# Patient Record
Sex: MALE | Race: WHITE | NOT HISPANIC OR LATINO | URBAN - METROPOLITAN AREA
[De-identification: names, ages, dates, MRNs, and addresses within clinical notes are randomized per-mention and may not be internally consistent; named-entity substitution may affect disease eponyms.]

---

## 2018-02-02 PROBLEM — Z00.00 ENCOUNTER FOR PREVENTIVE HEALTH EXAMINATION: Status: ACTIVE | Noted: 2018-02-02

## 2018-02-08 VITALS
HEART RATE: 60 BPM | TEMPERATURE: 97 F | RESPIRATION RATE: 16 BRPM | HEIGHT: 71 IN | OXYGEN SATURATION: 97 % | WEIGHT: 220.02 LBS | SYSTOLIC BLOOD PRESSURE: 122 MMHG | DIASTOLIC BLOOD PRESSURE: 55 MMHG

## 2018-02-08 NOTE — H&P ADULT - HISTORY OF PRESENT ILLNESS
***SKELETON:      67 y.o Male former smoker, with PMHx of HTN, Hyperlipidemia, GERD, NIDDM, KESHIA on CPAP @ night,     who presented to his cardiologist c/o CHUN     Denies CP,   palpitations, dizziness, syncope, recent PND/orthopnea, or LE edema.    In light of pt's risk factors, above CCS Anginal Class _____ symptoms, and an abnormal Stress test, pt is now referred to [facility] for recommended Cardiac Cath with possible intervention if clinically indicated to  r/o suspected underlying CAD. ***PT TO BRING IN ALL MEDS     67 y.o Male former smoker, with PMHx of HTN, Hyperlipidemia, GERD, NIDDM, KESHIA (non-compliant with CPAP >6months), Ventricular Ectopy s/p unsuccessful ablation @ [facility] ~ 2yrs ago (like secondary to low EF), Chronic Systolic HF (EF   ) on Entresto, who presented to his cardiologist Dr. Mathew for routine follow-up. Pt states he has been feeling much better since his HF meds were adjusted.  He has experienced dyspnea on exertion  and  "fleeting" CP while at rest in the past.   Presently he denies experiencing any symptoms.  He states he plays raVitryn without any limitations or any anginal symptoms.  Denies CP, SOB, palpitations, dizziness, syncope, recent PND/orthopnea, or LE edema.  Echo performed 10/31/17 revealed moderate diffuse hypokinesis, moderate left atrial dilatation, mild MR/TR,  LVEF of 34%.  No recent  diagnostic cardiac studies performed (Confirmed with Dr. Mathew's office).      In light of pt's risk factors, above CCS Anginal Class 4 symptoms in  and a depressed EF, pt is now referred to [facility] for recommended Right and Left Heart to r/o ischemic etiology for worsening HF. ***PT TO BRING IN ALL MEDS     67 y.o Male former smoker, with PMHx of HTN, Hyperlipidemia, GERD, NIDDM, KESHIA (non-compliant with CPAP >6months), Ventricular Ectopy s/p unsuccessful ablation @ [facility] ~ 2yrs ago (like secondary to low EF), Chronic Systolic HF (EF  34%) on Entresto, who presented to his cardiologist Dr. Mathew for routine follow-up. Pt states he has been feeling much better since his HF meds were adjusted.  He has experienced dyspnea on exertion  and  "fleeting" CP while at rest in the past.   Presently he denies experiencing any symptoms.  He states he plays raVastech without any limitations or any anginal symptoms.  Denies CP, SOB, palpitations, dizziness, syncope, recent PND/orthopnea, or LE edema.  Echo performed 10/31/17 revealed moderate diffuse hypokinesis, moderate left atrial dilatation, mild MR/TR,  LVEF of 34%.  No recent  diagnostic cardiac studies performed (Confirmed with Dr. Mathew's office).      In light of pt's risk factors, above CCS Anginal Class 4 symptoms in  and a depressed EF, pt is now referred to [facility] for recommended Right and Left Heart to r/o ischemic etiology for worsening HF. 67 y.o Male former smoker, with PMHx of HTN, Hyperlipidemia, GERD, NIDDM, KESHIA (non-compliant with CPAP >6months), Ventricular Ectopy s/p unsuccessful ablation @ [facility] ~ 2yrs ago (like secondary to low EF), Chronic Systolic HF (EF  34%) on Entresto, who presented to his cardiologist Dr. Mathew for routine follow-up. Pt states he has been feeling much better since his HF meds were adjusted.  He has experienced dyspnea on exertion  and  "fleeting" CP while at rest in the past.   Presently he denies experiencing any symptoms.  He states he plays raLet it Wave without any limitations or any anginal symptoms.  Denies CP, SOB, palpitations, dizziness, syncope, recent PND/orthopnea, or LE edema.  Echo performed 10/31/17 revealed moderate diffuse hypokinesis, moderate left atrial dilatation, mild MR/TR,  LVEF of 34%.  No recent  diagnostic cardiac studies performed (Confirmed with Dr. Mathew's office).      In light of pt's risk factors, above CCS Anginal Class 4 symptoms in  and a depressed EF, pt is now referred to [facility] for recommended Right and Left Heart to r/o ischemic etiology for worsening HF.

## 2018-02-08 NOTE — H&P ADULT - NSHPLABSRESULTS_GEN_ALL_CORE
EKG SR @ 60 BPM with Q waves in v1, v2, I EKG SR @ 60 BPM with Q waves in v1, v2, I    iStat labs 2/12/18 Na 139, K 4.7, Cl 102, Glu 142, BUN/Cr 23/0.7, Hgb 14.3, PT 12.2, INR 1.0

## 2018-02-08 NOTE — H&P ADULT - ASSESSMENT
67 y.o Male former smoker, with PMHx of HTN, Hyperlipidemia, GERD, NIDDM, KESHIA (non-compliant with CPAP >6months), Ventricular Ectopy s/p unsuccessful ablation @ [facility] ~ 2yrs ago (like secondary to low EF), Chronic Systolic HF (EF  34%) on Entresto, who presentes with CCS Anginal Class 4 symptoms and a depressed EF, who is now referred to [facility] for recommended Right and Left Heart to r/o ischemic etiology for worsening HF.        Pt takes ASA 81mg PO daily at home, last dose was today 2/12.    Risks & benefits of procedure and alternative therapy have been explained to the patient including but not limited to: allergic reaction, bleeding w/possible need for blood transfusion, infection, renal and vascular compromise, limb damage, arrhythmia, stroke, vessel dissection/perforation, Myocardial infarction, emergent CABG. Informed consent obtained and in chart. 67 y.o Male former smoker, with PMHx of HTN, Hyperlipidemia, GERD, NIDDM, EKSHIA (non-compliant with CPAP >6months), Ventricular Ectopy s/p unsuccessful ablation @ [facility] ~ 2yrs ago (like secondary to low EF), Chronic Systolic HF (EF  34%) on Entresto, who presentes with CCS Anginal Class 4 symptoms and a depressed EF, who is now referred to [facility] for recommended Right and Left Heart to r/o ischemic etiology for worsening HF.        Pt takes ASA 81mg PO daily at home, last dose was today 2/12. Pt was given ASA 243mg PO x 1 and Plavix 600mg PO x 1 prior to procedure.     iStat labs accepted per policy that iStat labs are accepted for first cath lab patients.    Risks & benefits of procedure and alternative therapy have been explained to the patient including but not limited to: allergic reaction, bleeding w/possible need for blood transfusion, infection, renal and vascular compromise, limb damage, arrhythmia, stroke, vessel dissection/perforation, Myocardial infarction, emergent CABG. Informed consent obtained and in chart.

## 2018-02-08 NOTE — H&P ADULT - PMH
Cardiomyopathy    Erectile dysfunction    Hyperlipidemia    Hypertension    KESHIA (obstructive sleep apnea) Cardiomyopathy    Erectile dysfunction    Hyperlipidemia    Hypertension    KESHIA (obstructive sleep apnea)    Ventricular ectopy

## 2018-02-12 ENCOUNTER — INPATIENT (INPATIENT)
Facility: HOSPITAL | Age: 68
LOS: 0 days | Discharge: HOME | DRG: 287 | End: 2018-02-12
Attending: INTERNAL MEDICINE | Admitting: INTERNAL MEDICINE
Payer: COMMERCIAL

## 2018-02-12 DIAGNOSIS — Z98.890 OTHER SPECIFIED POSTPROCEDURAL STATES: Chronic | ICD-10-CM

## 2018-02-12 LAB
ALBUMIN SERPL ELPH-MCNC: 4.4 G/DL — SIGNIFICANT CHANGE UP (ref 3.3–5)
ALP SERPL-CCNC: 69 U/L — SIGNIFICANT CHANGE UP (ref 40–120)
ALT FLD-CCNC: 25 U/L — SIGNIFICANT CHANGE UP (ref 10–45)
ANION GAP SERPL CALC-SCNC: 14 MMOL/L — SIGNIFICANT CHANGE UP (ref 5–17)
APTT BLD: 27.4 SEC — LOW (ref 27.5–37.4)
AST SERPL-CCNC: 23 U/L — SIGNIFICANT CHANGE UP (ref 10–40)
BASOPHILS NFR BLD AUTO: 0.5 % — SIGNIFICANT CHANGE UP (ref 0–2)
BILIRUB SERPL-MCNC: 0.4 MG/DL — SIGNIFICANT CHANGE UP (ref 0.2–1.2)
BUN SERPL-MCNC: 17 MG/DL — SIGNIFICANT CHANGE UP (ref 7–23)
CALCIUM SERPL-MCNC: 8.7 MG/DL — SIGNIFICANT CHANGE UP (ref 8.4–10.5)
CHLORIDE SERPL-SCNC: 100 MMOL/L — SIGNIFICANT CHANGE UP (ref 96–108)
CHOLEST SERPL-MCNC: 204 MG/DL — HIGH (ref 10–199)
CK MB CFR SERPL CALC: 2.8 NG/ML — SIGNIFICANT CHANGE UP (ref 0–6.7)
CO2 SERPL-SCNC: 22 MMOL/L — SIGNIFICANT CHANGE UP (ref 22–31)
CREAT SERPL-MCNC: 0.73 MG/DL — SIGNIFICANT CHANGE UP (ref 0.5–1.3)
CRP SERPL-MCNC: 0.21 MG/DL — SIGNIFICANT CHANGE UP (ref 0–0.4)
EOSINOPHIL NFR BLD AUTO: 5.3 % — SIGNIFICANT CHANGE UP (ref 0–6)
GLUCOSE SERPL-MCNC: 147 MG/DL — HIGH (ref 70–99)
HBA1C BLD-MCNC: 6.7 % — HIGH (ref 4–5.6)
HCT VFR BLD CALC: 41.2 % — SIGNIFICANT CHANGE UP (ref 39–50)
HDLC SERPL-MCNC: 43 MG/DL — SIGNIFICANT CHANGE UP (ref 40–125)
HGB BLD-MCNC: 14 G/DL — SIGNIFICANT CHANGE UP (ref 13–17)
INR BLD: 1.02 — SIGNIFICANT CHANGE UP (ref 0.88–1.16)
LIPID PNL WITH DIRECT LDL SERPL: 128 MG/DL — SIGNIFICANT CHANGE UP
LYMPHOCYTES # BLD AUTO: 31.8 % — SIGNIFICANT CHANGE UP (ref 13–44)
MCHC RBC-ENTMCNC: 30.2 PG — SIGNIFICANT CHANGE UP (ref 27–34)
MCHC RBC-ENTMCNC: 34 G/DL — SIGNIFICANT CHANGE UP (ref 32–36)
MCV RBC AUTO: 89 FL — SIGNIFICANT CHANGE UP (ref 80–100)
MONOCYTES NFR BLD AUTO: 10.1 % — SIGNIFICANT CHANGE UP (ref 2–14)
NEUTROPHILS NFR BLD AUTO: 52.3 % — SIGNIFICANT CHANGE UP (ref 43–77)
PLATELET # BLD AUTO: 135 K/UL — LOW (ref 150–400)
POTASSIUM SERPL-MCNC: 4.4 MMOL/L — SIGNIFICANT CHANGE UP (ref 3.5–5.3)
POTASSIUM SERPL-SCNC: 4.4 MMOL/L — SIGNIFICANT CHANGE UP (ref 3.5–5.3)
PROT SERPL-MCNC: 7.1 G/DL — SIGNIFICANT CHANGE UP (ref 6–8.3)
PROTHROM AB SERPL-ACNC: 11.3 SEC — SIGNIFICANT CHANGE UP (ref 9.8–12.7)
RBC # BLD: 4.63 M/UL — SIGNIFICANT CHANGE UP (ref 4.2–5.8)
RBC # FLD: 13.2 % — SIGNIFICANT CHANGE UP (ref 10.3–16.9)
SODIUM SERPL-SCNC: 136 MMOL/L — SIGNIFICANT CHANGE UP (ref 135–145)
TOTAL CHOLESTEROL/HDL RATIO MEASUREMENT: 4.7 RATIO — SIGNIFICANT CHANGE UP (ref 3.4–9.6)
TRIGL SERPL-MCNC: 165 MG/DL — HIGH (ref 10–149)
WBC # BLD: 6.2 K/UL — SIGNIFICANT CHANGE UP (ref 3.8–10.5)
WBC # FLD AUTO: 6.2 K/UL — SIGNIFICANT CHANGE UP (ref 3.8–10.5)

## 2018-02-12 PROCEDURE — 93460 R&L HRT ART/VENTRICLE ANGIO: CPT | Mod: 26

## 2018-02-12 RX ORDER — CHLORHEXIDINE GLUCONATE 213 G/1000ML
1 SOLUTION TOPICAL ONCE
Qty: 0 | Refills: 0 | Status: DISCONTINUED | OUTPATIENT
Start: 2018-02-12 | End: 2018-02-12

## 2018-02-12 RX ORDER — CLOPIDOGREL BISULFATE 75 MG/1
600 TABLET, FILM COATED ORAL ONCE
Qty: 0 | Refills: 0 | Status: COMPLETED | OUTPATIENT
Start: 2018-02-12 | End: 2018-02-12

## 2018-02-12 RX ORDER — SODIUM CHLORIDE 9 MG/ML
500 INJECTION, SOLUTION INTRAVENOUS
Qty: 0 | Refills: 0 | Status: DISCONTINUED | OUTPATIENT
Start: 2018-02-12 | End: 2018-02-12

## 2018-02-12 RX ORDER — SODIUM CHLORIDE 9 MG/ML
1000 INJECTION, SOLUTION INTRAVENOUS
Qty: 0 | Refills: 0 | Status: DISCONTINUED | OUTPATIENT
Start: 2018-02-12 | End: 2018-02-12

## 2018-02-12 RX ORDER — ASPIRIN/CALCIUM CARB/MAGNESIUM 324 MG
243 TABLET ORAL ONCE
Qty: 0 | Refills: 0 | Status: COMPLETED | OUTPATIENT
Start: 2018-02-12 | End: 2018-02-12

## 2018-02-12 RX ADMIN — CLOPIDOGREL BISULFATE 600 MILLIGRAM(S): 75 TABLET, FILM COATED ORAL at 08:51

## 2018-02-12 RX ADMIN — Medication 243 MILLIGRAM(S): at 08:51

## 2018-02-12 RX ADMIN — SODIUM CHLORIDE 30 MILLILITER(S): 9 INJECTION, SOLUTION INTRAVENOUS at 08:53

## 2018-02-12 NOTE — PROGRESS NOTE ADULT - SUBJECTIVE AND OBJECTIVE BOX
Interventional Cardiology PA SDA Discharge Note    Patient without complaints. Ambulated and voided without difficulties    Afebrile, VSS    Ext:    		Right radial: no hematoma, no bleed, 2+ radial pulse. Brachial: no hematoma, no bleed, distal pulse intact    A/P:  67 y.o Male former smoker, with PMHx of HTN, Hyperlipidemia, GERD, NIDDM, KESHIA (non-compliant with CPAP >6months), Ventricular Ectopy s/p unsuccessful ablation @ [facility] ~ 2yrs ago (like secondary to low EF), Chronic Systolic HF (EF  34%) on Entresto, who presented to his cardiologist Dr. Mathew for routine follow-up. Pt states he has been feeling much better since his HF meds were adjusted.  He has experienced dyspnea on exertion  and  "fleeting" CP while at rest in the past.   Presently he denies experiencing any symptoms.  He states he plays raque[employer]os Riskall without any limitations or any anginal symptoms.    Echo performed 10/31/17 revealed moderate diffuse hypokinesis, moderate left atrial dilatation, mild MR/TR,  LVEF of 34%.  No recent  diagnostic cardiac studies performed (Confirmed with Dr. Mathew's office).  Pt was referred for cardiac catheterization today revealing non ischemic cardiomyopathy: 30% mLAD stenosis, pOM1 50% stenosis s/p FFR 0.98, RCA 30% stenosis, RHC:  RA=5, RV=34/0, PAP=30/10, PCWP=9 mm Hg. LV Gram:  30% EF, global hypokinesis, no AS, EDP=8 mm Hg. Patient recommended for EP consultation for possible ablation prior to discharge.   Patient will follow up with Dr. Mathew upon discharge.                     1.	Stable for discharge as per attending  ___[place]______ after bed rest, pt voids, groin/wrist stable and 30 minutes of ambulation.  2.	Follow-up with PMD/Cardiologist ___Dr. Smith________ in 1-2 weeks  3.	Discharged forms signed and copies in chart Interventional Cardiology PA SDA Discharge Note    Patient without complaints. Ambulated and voided without difficulties    Afebrile, VSS    Ext:    		Right radial: no hematoma, no bleed, 2+ radial pulse. Brachial: no hematoma, no bleed, distal pulse intact    A/P:  67 y.o Male former smoker, with PMHx of HTN, Hyperlipidemia, GERD, NIDDM, KESHIA (non-compliant with CPAP >6months), Ventricular Ectopy s/p unsuccessful ablation @ [facility] ~ 2yrs ago (like secondary to low EF), Chronic Systolic HF (EF  34%) on Entresto, who presented to his cardiologist Dr. Mathew for routine follow-up. Pt states he has been feeling much better since his HF meds were adjusted.  He has experienced dyspnea on exertion  and  "fleeting" CP while at rest in the past.   Presently he denies experiencing any symptoms.  He states he plays raqueDivideall without any limitations or any anginal symptoms.    Echo performed 10/31/17 revealed moderate diffuse hypokinesis, moderate left atrial dilatation, mild MR/TR,  LVEF of 34%.  No recent  diagnostic cardiac studies performed (Confirmed with Dr. Mathew's office).  Pt was referred for cardiac catheterization today revealing non ischemic cardiomyopathy: 30% mLAD stenosis, pOM1 50% stenosis s/p FFR 0.98, RCA 30% stenosis, RHC:  RA=5, RV=34/0, PAP=30/10, PCWP=9 mm Hg. LV Gram:  30% EF, global hypokinesis, no AS, EDP=8 mm Hg. Patient recommended for EP consultation to discuss possible ablation prior to discharge. Patient will follow up with Dr. Mathew upon discharge.                     1.	Stable for discharge as per attending  ___[place]______ after bed rest, pt voids, groin/wrist stable and 30 minutes of ambulation.  2.	Follow-up with PMD/Cardiologist ___Dr. Smith________ in 1-2 weeks  3.	Discharged forms signed and copies in chart

## 2018-02-13 ENCOUNTER — OTHER (OUTPATIENT)
Age: 68
End: 2018-02-13

## 2018-02-13 ENCOUNTER — INPATIENT (INPATIENT)
Facility: HOSPITAL | Age: 68
LOS: 0 days | Discharge: HOME | DRG: 274 | End: 2018-02-14
Attending: INTERNAL MEDICINE | Admitting: INTERNAL MEDICINE
Payer: COMMERCIAL

## 2018-02-13 VITALS
SYSTOLIC BLOOD PRESSURE: 113 MMHG | DIASTOLIC BLOOD PRESSURE: 66 MMHG | WEIGHT: 200.4 LBS | OXYGEN SATURATION: 97 % | RESPIRATION RATE: 18 BRPM | HEART RATE: 60 BPM | HEIGHT: 71 IN

## 2018-02-13 DIAGNOSIS — I10 ESSENTIAL (PRIMARY) HYPERTENSION: ICD-10-CM

## 2018-02-13 DIAGNOSIS — I49.3 VENTRICULAR PREMATURE DEPOLARIZATION: ICD-10-CM

## 2018-02-13 DIAGNOSIS — Z98.890 OTHER SPECIFIED POSTPROCEDURAL STATES: Chronic | ICD-10-CM

## 2018-02-13 DIAGNOSIS — I42.9 CARDIOMYOPATHY, UNSPECIFIED: ICD-10-CM

## 2018-02-13 DIAGNOSIS — E78.00 PURE HYPERCHOLESTEROLEMIA, UNSPECIFIED: ICD-10-CM

## 2018-02-13 PROBLEM — G47.33 OBSTRUCTIVE SLEEP APNEA (ADULT) (PEDIATRIC): Chronic | Status: ACTIVE | Noted: 2018-02-08

## 2018-02-13 PROBLEM — N52.9 MALE ERECTILE DYSFUNCTION, UNSPECIFIED: Chronic | Status: ACTIVE | Noted: 2018-02-08

## 2018-02-13 PROBLEM — E78.5 HYPERLIPIDEMIA, UNSPECIFIED: Chronic | Status: ACTIVE | Noted: 2018-02-08

## 2018-02-13 LAB — GLUCOSE BLDC GLUCOMTR-MCNC: 136 MG/DL — HIGH (ref 70–99)

## 2018-02-13 PROCEDURE — 93654 COMPRE EP EVAL TX VT: CPT

## 2018-02-13 PROCEDURE — 93623 PRGRMD STIMJ&PACG IV RX NFS: CPT | Mod: 26

## 2018-02-13 PROCEDURE — 99223 1ST HOSP IP/OBS HIGH 75: CPT | Mod: 25

## 2018-02-13 PROCEDURE — 74150 CT ABDOMEN W/O CONTRAST: CPT | Mod: 26

## 2018-02-13 PROCEDURE — 71250 CT THORAX DX C-: CPT | Mod: 26

## 2018-02-13 PROCEDURE — 93621 COMP EP EVL L PAC&REC C SINS: CPT | Mod: 26

## 2018-02-13 RX ORDER — AMIODARONE HYDROCHLORIDE 400 MG/1
400 TABLET ORAL
Qty: 0 | Refills: 0 | Status: DISCONTINUED | OUTPATIENT
Start: 2018-02-13 | End: 2018-02-13

## 2018-02-13 RX ORDER — SACUBITRIL AND VALSARTAN 24; 26 MG/1; MG/1
1 TABLET, FILM COATED ORAL
Qty: 0 | Refills: 0 | COMMUNITY

## 2018-02-13 RX ORDER — ASPIRIN/CALCIUM CARB/MAGNESIUM 324 MG
1 TABLET ORAL
Qty: 0 | Refills: 0 | COMMUNITY

## 2018-02-13 RX ORDER — ATORVASTATIN CALCIUM 80 MG/1
10 TABLET, FILM COATED ORAL DAILY
Qty: 0 | Refills: 0 | Status: DISCONTINUED | OUTPATIENT
Start: 2018-02-13 | End: 2018-02-14

## 2018-02-13 RX ORDER — ACETAMINOPHEN 500 MG
650 TABLET ORAL EVERY 6 HOURS
Qty: 0 | Refills: 0 | Status: DISCONTINUED | OUTPATIENT
Start: 2018-02-13 | End: 2018-02-14

## 2018-02-13 RX ORDER — ASPIRIN/CALCIUM CARB/MAGNESIUM 324 MG
81 TABLET ORAL DAILY
Qty: 0 | Refills: 0 | Status: DISCONTINUED | OUTPATIENT
Start: 2018-02-13 | End: 2018-02-14

## 2018-02-13 RX ORDER — CARVEDILOL PHOSPHATE 80 MG/1
1 CAPSULE, EXTENDED RELEASE ORAL
Qty: 0 | Refills: 0 | COMMUNITY

## 2018-02-13 RX ORDER — ATORVASTATIN CALCIUM 80 MG/1
1 TABLET, FILM COATED ORAL
Qty: 0 | Refills: 0 | COMMUNITY

## 2018-02-13 RX ORDER — FUROSEMIDE 40 MG
1 TABLET ORAL
Qty: 0 | Refills: 0 | COMMUNITY

## 2018-02-13 RX ORDER — FUROSEMIDE 40 MG
20 TABLET ORAL DAILY
Qty: 0 | Refills: 0 | Status: DISCONTINUED | OUTPATIENT
Start: 2018-02-13 | End: 2018-02-14

## 2018-02-13 RX ORDER — CARVEDILOL PHOSPHATE 80 MG/1
12.5 CAPSULE, EXTENDED RELEASE ORAL
Qty: 0 | Refills: 0 | Status: DISCONTINUED | OUTPATIENT
Start: 2018-02-13 | End: 2018-02-14

## 2018-02-13 RX ORDER — OXYCODONE AND ACETAMINOPHEN 5; 325 MG/1; MG/1
1 TABLET ORAL ONCE
Qty: 0 | Refills: 0 | Status: DISCONTINUED | OUTPATIENT
Start: 2018-02-13 | End: 2018-02-14

## 2018-02-13 RX ORDER — AMIODARONE HYDROCHLORIDE 400 MG/1
400 TABLET ORAL THREE TIMES A DAY
Qty: 0 | Refills: 0 | Status: DISCONTINUED | OUTPATIENT
Start: 2018-02-13 | End: 2018-02-14

## 2018-02-13 RX ORDER — SACUBITRIL AND VALSARTAN 24; 26 MG/1; MG/1
1 TABLET, FILM COATED ORAL
Qty: 0 | Refills: 0 | Status: DISCONTINUED | OUTPATIENT
Start: 2018-02-13 | End: 2018-02-14

## 2018-02-13 RX ADMIN — Medication 650 MILLIGRAM(S): at 19:20

## 2018-02-13 RX ADMIN — Medication 650 MILLIGRAM(S): at 17:56

## 2018-02-13 RX ADMIN — CARVEDILOL PHOSPHATE 12.5 MILLIGRAM(S): 80 CAPSULE, EXTENDED RELEASE ORAL at 17:56

## 2018-02-13 RX ADMIN — AMIODARONE HYDROCHLORIDE 400 MILLIGRAM(S): 400 TABLET ORAL at 21:55

## 2018-02-13 RX ADMIN — SACUBITRIL AND VALSARTAN 1 TABLET(S): 24; 26 TABLET, FILM COATED ORAL at 19:55

## 2018-02-13 NOTE — H&P ADULT - PMH
Cardiomyopathy    Erectile dysfunction    Hyperlipidemia    Hypertension    KESHIA (obstructive sleep apnea)    Ventricular ectopy

## 2018-02-13 NOTE — H&P ADULT - PROBLEM SELECTOR PLAN 1
The patient presents today for a PVC ablation. The procedure, and its associated risks, including but not limited to infection, vascular injury, cardiac perforation, phrenic nerve injury, and thromboembolism were discussed in detail. The patient has agreed to the procedure. Consent was signed. The patient will first proceed to CT scan wearing a cardio insight vest and then for ablation.    We will reevaluate need for medical therapies after the ablation.    Bedrest for 6 hours following sheath pull.

## 2018-02-13 NOTE — H&P ADULT - HISTORY OF PRESENT ILLNESS
67 y.o Male former smoker, with PMHx of HTN, Hyperlipidemia, GERD, NIDDM, KESHIA (non-compliant with CPAP >6months), Ventricular Ectopy s/p unsuccessful ablation @ [facility] ~ 2yrs ago (like secondary to low EF), Chronic Systolic HF (EF  34%) on Entresto, who presented to his cardiologist Dr. Mathew for routine follow-up. Pt states he has been feeling much better since his HF meds were adjusted.  He has experienced dyspnea on exertion  and  "fleeting" CP while at rest in the past.   Presently he denies experiencing any symptoms.  He states he plays ra"[employer] without any limitations or any anginal symptoms.  Denies CP, SOB, palpitations, dizziness, syncope, recent PND/orthopnea, or LE edema.  Echo performed 10/31/17 revealed moderate diffuse hypokinesis, moderate left atrial dilatation, mild MR/TR,  LVEF of 34%.  No recent  diagnostic cardiac studies performed (Confirmed with Dr. Mathew's office).      In light of pt's risk factors, above CCS Anginal Class 4 symptoms in  and a depressed EF, pt is now referred to [facility] for recommended Right and Left Heart to r/o ischemic etiology for worsening HF. Mr. Brown is a 67 y.o M, former smoker, with PMHx of HTN, Hyperlipidemia, GERD, NIDDM, KESHIA (non-compliant with CPAP >6months), high PVC burden s/p aborted ablation at [facility] 2yrs ago, Chronic Systolic HF (EF  34%) on Entresto, who presented to [facility] yesterday for a right and left heart cath with Dr. Smith which was nonobstructive. While in   Pt states he has been feeling much better since his HF meds were adjusted.  He has experienced dyspnea on exertion  and  "fleeting" CP while at rest in the past.   Presently he denies experiencing any symptoms.  He states he plays raqueCozy Queen without any limitations or any anginal symptoms.  Denies CP, SOB, palpitations, dizziness, syncope, recent PND/orthopnea, or LE edema.  Echo performed 10/31/17 revealed moderate diffuse hypokinesis, moderate left atrial dilatation, mild MR/TR,  LVEF of 34%.  No recent  diagnostic cardiac studies performed (Confirmed with Dr. Mathew's office).      In light of pt's risk factors, above CCS Anginal Class 4 symptoms in  and a depressed EF, pt is now referred to [facility] for recommended Right and Left Heart to r/o ischemic etiology for worsening HF.      Mr. Brown is s/p right and left cardiac catheterization which resulted as non-ischemic. He has a history of frequent PVCs with a reported burden of 20% causing systolic HF, EF 30% in cath, decreased from 34% in 10/2017. He is on carvedilol with no improvement in burden. He is asymptomatic, denying palpitations, c/p and lightheadedness. He had one failed PVC ablation in [place], [place]. He was told that the isolated ectopy was too close to sensitive structures and couldn't be ablated. They decided to put him on medical therapy. Despite carvedilol, entresto, and lasix, the patient's EF continues to deteriorate. He will be admitted today under EP for an ablation tomorrow with Dr. Barger. Mr. Brown is a 67 y.o M, former smoker, with PMHx of HTN, Hyperlipidemia, GERD, NIDDM, KESHIA (non-compliant with CPAP >6months), high PVC burden s/p aborted ablation at [facility] 2yrs ago, Chronic Systolic HF (EF  34%) on Entresto, s/p non-obstructive right and left sided cath with Dr. Smith yesterday who presents for an elective ablation of his PVC substrate.     The patient has a history of PVCs with a burden of around 20%. He went for an ablation at a hospital in [place], [place] two years ago, but he was told that the isolated ectopy was in a sensitive area - likely near the AV node, and they decided not to ablate and instead manage medically. While in   Pt states he has been feeling much better since his HF meds were adjusted.  He has experienced dyspnea on exertion  and  "fleeting" CP while at rest in the past.   Presently he denies experiencing any symptoms.  He states he plays raqueCono-C without any limitations or any anginal symptoms.  Denies CP, SOB, palpitations, dizziness, syncope, recent PND/orthopnea, or LE edema.  Echo performed 10/31/17 revealed moderate diffuse hypokinesis, moderate left atrial dilatation, mild MR/TR,  LVEF of 34%.  No recent  diagnostic cardiac studies performed (Confirmed with Dr. Mathew's office).      In light of pt's risk factors, above CCS Anginal Class 4 symptoms in  and a depressed EF, pt is now referred to [facility] for recommended Right and Left Heart to r/o ischemic etiology for worsening HF.      Mr. Brown is s/p right and left cardiac catheterization which resulted as non-ischemic. He has a history of frequent PVCs with a reported burden of 20% causing systolic HF, EF 30% in cath, decreased from 34% in 10/2017. He is on carvedilol with no improvement in burden. He is asymptomatic, denying palpitations, c/p and lightheadedness. He had one failed PVC ablation in [place], [place]. He was told that the isolated ectopy was too close to sensitive structures and couldn't be ablated. They decided to put him on medical therapy. Despite carvedilol, entresto, and lasix, the patient's EF continues to deteriorate. He will be admitted today under EP for an ablation tomorrow with Dr. Barger. Mr. Brown is a 67 y.o M, former smoker, with PMHx of HTN, Hyperlipidemia, GERD, NIDDM, KESHIA (non-compliant with CPAP >6months), high PVC burden s/p aborted ablation at [facility] 2yrs ago, Chronic Systolic HF (EF  34%, 10/17) on Entresto, s/p non-obstructive right and left sided cath with Dr. Smith yesterday who presents for an elective ablation of his PVC substrate.     The patient has a history of PVCs with a burden of around 20%. He went for an ablation at a hospital in [place], [place] two years ago, but he was told that the isolated ectopy was in a sensitive area - likely near the AV node, and they decided not to ablate and instead manage medically. He has been maintained on optimal medical management, including carvedilol, lasix (two months ago his ACE/ARB was switched to Entresto), with no EF improvement. He states that his dyspnea on exertion has improved since starting Entresto, but he c/o occasionally experiencing sob and c/p while at rest. He was referred for right and left sided cath with Dr. Smith yesterday to determine if his anginal-like symptoms/worsening HF was caused by CAD, however, the cath was non-obstructive. His intracath EF was 30%. We spoke to the patient after his cath yesterday about how his low HF is likely caused by his high PVC burden. He decided that he wanted to proceed with an ablation. Given that the patient is already in the city for his cath procedure, we booked him in as an ambulatory procedure for today. EKG from yesterday show PVCs originating from the left posterior-superior process of the left ventricle. The ablation will be guided with cardio [employer] technology. At present, he denies sob, c/p, palpitations, and lightheadedness.

## 2018-02-13 NOTE — H&P ADULT - ASSESSMENT
Mr. Brown is a 67 y.o M, former smoker, with PMHx of HTN, Hyperlipidemia, GERD, NIDDM, KESHIA (non-compliant with CPAP >6months), high PVC burden s/p aborted ablation at [facility] 2yrs ago, Chronic Systolic HF (EF  34%, 10/17) on Entresto, s/p non-obstructive right and left sided cath with Dr. Smith yesterday who presents for an elective ablation of his PVC substrate.

## 2018-02-13 NOTE — H&P ADULT - NSHPPHYSICALEXAM_GEN_ALL_CORE
General: No acute Distress  Neck: Supple, NO JVD  Cardiac: S1 S2, No M/R/G  Pulmonary: CTAB, Breathing unlabored, No Rhonchi/Rales/Wheezing  Abdomen: Soft, Non -tender, +BS x 4 quads  Extremities: No Rashes, No edema  Neuro: A/o x 3, No focal deficits

## 2018-02-13 NOTE — H&P ADULT - NSHPLABSRESULTS_GEN_ALL_CORE
14.0   6.2   )-----------( 135      ( 12 Feb 2018 07:45 )             41.2     02-12    136  |  100  |  17  ----------------------------<  147<H>  4.4   |  22  |  0.73    Ca    8.7      12 Feb 2018 07:45    TPro  7.1  /  Alb  4.4  /  TBili  0.4  /  DBili  x   /  AST  23  /  ALT  25  /  AlkPhos  69  02-12      PT/INR - ( 12 Feb 2018 07:45 )   PT: 11.3 sec;   INR: 1.02          PTT - ( 12 Feb 2018 07:45 )  PTT:27.4 sec

## 2018-02-14 ENCOUNTER — TRANSCRIPTION ENCOUNTER (OUTPATIENT)
Age: 68
End: 2018-02-14

## 2018-02-14 VITALS — WEIGHT: 225.75 LBS

## 2018-02-14 LAB
ANION GAP SERPL CALC-SCNC: 12 MMOL/L — SIGNIFICANT CHANGE UP (ref 5–17)
APTT BLD: 26.6 SEC — LOW (ref 27.5–37.4)
BUN SERPL-MCNC: 17 MG/DL — SIGNIFICANT CHANGE UP (ref 7–23)
CALCIUM SERPL-MCNC: 8.7 MG/DL — SIGNIFICANT CHANGE UP (ref 8.4–10.5)
CHLORIDE SERPL-SCNC: 101 MMOL/L — SIGNIFICANT CHANGE UP (ref 96–108)
CO2 SERPL-SCNC: 23 MMOL/L — SIGNIFICANT CHANGE UP (ref 22–31)
CREAT SERPL-MCNC: 0.73 MG/DL — SIGNIFICANT CHANGE UP (ref 0.5–1.3)
GLUCOSE BLDC GLUCOMTR-MCNC: 121 MG/DL — HIGH (ref 70–99)
GLUCOSE SERPL-MCNC: 164 MG/DL — HIGH (ref 70–99)
INR BLD: 1.09 — SIGNIFICANT CHANGE UP (ref 0.88–1.16)
MAGNESIUM SERPL-MCNC: 2 MG/DL — SIGNIFICANT CHANGE UP (ref 1.6–2.6)
POTASSIUM SERPL-MCNC: 4.7 MMOL/L — SIGNIFICANT CHANGE UP (ref 3.5–5.3)
POTASSIUM SERPL-SCNC: 4.7 MMOL/L — SIGNIFICANT CHANGE UP (ref 3.5–5.3)
PROTHROM AB SERPL-ACNC: 12.1 SEC — SIGNIFICANT CHANGE UP (ref 9.8–12.7)
SODIUM SERPL-SCNC: 136 MMOL/L — SIGNIFICANT CHANGE UP (ref 135–145)

## 2018-02-14 PROCEDURE — 82550 ASSAY OF CK (CPK): CPT

## 2018-02-14 PROCEDURE — C1894: CPT

## 2018-02-14 PROCEDURE — 80061 LIPID PANEL: CPT

## 2018-02-14 PROCEDURE — 86900 BLOOD TYPING SEROLOGIC ABO: CPT

## 2018-02-14 PROCEDURE — 82553 CREATINE MB FRACTION: CPT

## 2018-02-14 PROCEDURE — C1769: CPT

## 2018-02-14 PROCEDURE — 86901 BLOOD TYPING SEROLOGIC RH(D): CPT

## 2018-02-14 PROCEDURE — 83036 HEMOGLOBIN GLYCOSYLATED A1C: CPT

## 2018-02-14 PROCEDURE — 85730 THROMBOPLASTIN TIME PARTIAL: CPT

## 2018-02-14 PROCEDURE — 85025 COMPLETE CBC W/AUTO DIFF WBC: CPT

## 2018-02-14 PROCEDURE — 36415 COLL VENOUS BLD VENIPUNCTURE: CPT

## 2018-02-14 PROCEDURE — 85610 PROTHROMBIN TIME: CPT

## 2018-02-14 PROCEDURE — 99239 HOSP IP/OBS DSCHRG MGMT >30: CPT

## 2018-02-14 PROCEDURE — 93460 R&L HRT ART/VENTRICLE ANGIO: CPT

## 2018-02-14 PROCEDURE — 86140 C-REACTIVE PROTEIN: CPT

## 2018-02-14 PROCEDURE — 86850 RBC ANTIBODY SCREEN: CPT

## 2018-02-14 PROCEDURE — 80053 COMPREHEN METABOLIC PANEL: CPT

## 2018-02-14 PROCEDURE — C1887: CPT

## 2018-02-14 RX ORDER — AMIODARONE HYDROCHLORIDE 400 MG/1
2 TABLET ORAL
Qty: 63 | Refills: 0 | OUTPATIENT
Start: 2018-02-14 | End: 2018-03-15

## 2018-02-14 RX ORDER — ACETAMINOPHEN 500 MG
2 TABLET ORAL
Qty: 0 | Refills: 0 | COMMUNITY
Start: 2018-02-14

## 2018-02-14 RX ORDER — AMIODARONE HYDROCHLORIDE 400 MG/1
2 TABLET ORAL
Qty: 42 | Refills: 0 | OUTPATIENT
Start: 2018-02-14 | End: 2018-02-20

## 2018-02-14 RX ADMIN — Medication 1 TABLET(S): at 10:49

## 2018-02-14 RX ADMIN — AMIODARONE HYDROCHLORIDE 400 MILLIGRAM(S): 400 TABLET ORAL at 06:18

## 2018-02-14 RX ADMIN — Medication 81 MILLIGRAM(S): at 10:49

## 2018-02-14 RX ADMIN — CARVEDILOL PHOSPHATE 12.5 MILLIGRAM(S): 80 CAPSULE, EXTENDED RELEASE ORAL at 06:18

## 2018-02-14 RX ADMIN — SACUBITRIL AND VALSARTAN 1 TABLET(S): 24; 26 TABLET, FILM COATED ORAL at 06:18

## 2018-02-14 RX ADMIN — Medication 20 MILLIGRAM(S): at 06:17

## 2018-02-14 NOTE — DISCHARGE NOTE ADULT - CARE PROVIDER_API CALL
Johnathon Barger), Cardiac Electrophysiology; Cardiology; Cardiovascular Disease  [address] [place] [address]  Phone: [phone]  Fax: [phone]

## 2018-02-14 NOTE — DISCHARGE NOTE ADULT - MEDICATION SUMMARY - MEDICATIONS TO TAKE
I will START or STAY ON the medications listed below when I get home from the hospital:    Ecotrin Adult Low Strength 81 mg oral delayed release tablet  -- 1 tab(s) by mouth once a day  -- Indication: For Pure hypercholesterolemia    acetaminophen 325 mg oral tablet  -- 2 tab(s) by mouth every 6 hours, As needed, Moderate Pain (4 - 6)  -- Indication: For Post-op pain     sacubitril-valsartan 97 mg-103 mg oral tablet  -- 1 tab(s) by mouth 2 times a day  -- Indication: For Cardiomyopathy    Pacerone 200 mg oral tablet  -- 2 tab(s) by mouth 3 times a day for one week (until 7/21), then take 1 tab once daily for 3 weeks March 14. MDD:6 tabs first week, 1 tab remaining three weeks  -- Indication: For Ventricular ectopy    atorvastatin 10 mg oral tablet  -- 1 tab(s) by mouth once a day  -- Indication: For Pure hypercholesterolemia    carvedilol 12.5 mg oral tablet  -- 1 tab(s) by mouth 2 times a day  -- Indication: For Cardiomyopathy    Lasix 20 mg oral tablet  -- 1 tab(s) by mouth once a day  -- Indication: For Cardiomyopathy    multivitamin  -- 1 tab(s) by mouth once a day  -- Indication: For Health promotion I will START or STAY ON the medications listed below when I get home from the hospital:    Ecotrin Adult Low Strength 81 mg oral delayed release tablet  -- 1 tab(s) by mouth once a day  -- Indication: For Pure hypercholesterolemia    acetaminophen 325 mg oral tablet  -- 2 tab(s) by mouth every 6 hours, As needed, Moderate Pain (4 - 6)  -- Indication: For Post-op pain     sacubitril-valsartan 97 mg-103 mg oral tablet  -- 1 tab(s) by mouth 2 times a day  -- Indication: For Cardiomyopathy    Pacerone 200 mg oral tablet  -- 2 tab(s) by mouth 3 times a day for one week (until 2/21), then take 1 tab once daily for 3 weeks March 14  -- Indication: For Ventricular ectopy    atorvastatin 10 mg oral tablet  -- 1 tab(s) by mouth once a day  -- Indication: For Pure hypercholesterolemia    carvedilol 12.5 mg oral tablet  -- 1 tab(s) by mouth 2 times a day  -- Indication: For Cardiomyopathy    Lasix 20 mg oral tablet  -- 1 tab(s) by mouth once a day  -- Indication: For Cardiomyopathy    multivitamin  -- 1 tab(s) by mouth once a day  -- Indication: For Health promotion

## 2018-02-14 NOTE — DISCHARGE NOTE ADULT - HOSPITAL COURSE
Mr. Brown is a 67 y.o M, former smoker, with PMHx of HTN, Hyperlipidemia, GERD, NIDDM, KESHIA (non-compliant with CPAP >6months), high PVC burden s/p aborted ablation at [facility] 2yrs ago, Chronic Systolic HF (EF  34%, 10/17) on Entresto, s/p non-obstructive right and left sided cath with Dr. Smith yesterday who presents for an elective ablation of his PVC substrate.     The patient has a history of PVCs with a burden of around 20%. He went for an ablation at a hospital in [place], [place] two years ago, but he was told that the isolated ectopy was in a sensitive area - likely near the AV node, and they decided not to ablate and instead manage medically. He has been maintained on optimal medical management, including carvedilol, lasix (two months ago his ACE/ARB was switched to Entresto), with no EF improvement. He states that his dyspnea on exertion has improved since starting Entresto, but he c/o occasionally experiencing sob and c/p while at rest. He was referred for right and left sided cath with Dr. Smith yesterday to determine if his anginal-like symptoms/worsening HF was caused by CAD, however, the cath was non-obstructive. His intracath EF was 30%. We spoke to the patient after his cath yesterday about how his low HF is likely caused by his high PVC burden. He decided that he wanted to proceed with an ablation. Given that the patient is already in the city for his cath procedure, we booked him in as an ambulatory procedure for today. EKG from yesterday show PVCs originating from the left posterior-superior process of the left ventricle. The ablation will be guided with cardio [employer] technology. At present, he denies sob, c/p, palpitations, and lightheadedness.       2/14/18:    The patient is now s/p PVC ablation. He denies c/p, sob, palpitations, lightheadedness, groin pain or bleeding, and back pain. He had been c/o R shoulder pain post-procedure, but this was relieved with tylenol.     Tele: Sinus rhythm with multifocal PVC, 6 beats NSVT     Vital Signs Last 24 Hrs  T(C): 36.3 (14 Feb 2018 05:25), Max: 37.1 (13 Feb 2018 17:49)  T(F): 97.3 (14 Feb 2018 05:25), Max: 98.7 (13 Feb 2018 17:49)  HR: 70 (14 Feb 2018 04:55) (60 - 70)  BP: 110/58 (14 Feb 2018 04:55) (108/60 - 116/61)  BP(mean): 95 (14 Feb 2018 04:55) (81 - 95)  RR: 18 (13 Feb 2018 20:16) (18 - 18)  SpO2: 96% (14 Feb 2018 04:55) (96% - 97%)    General: No acute Distress  Neck: Supple, NO JVD  Cardiac: S1 S2, No M/R/G  Pulmonary: CTAB, Breathing unlabored, No Rhonchi/Rales/Wheezing  Abdomen: Soft, Non -tender, +BS x 4 quads  Extremities: No Rashes, No edema  Neuro: A/o x 3, No focal deficits  Skin: Femoral vein dressings removed b/l, no bleeding or hematoma. 3+ femoral and pedal pulses b/l    A/P:    - Follow all home care instructions as mentioned above.   - Start amiodarone (see care plan). Prescription sent.   - Continue all home medicines.   - Follow-up on March 5 at [facility].

## 2018-02-14 NOTE — DISCHARGE NOTE ADULT - PATIENT PORTAL LINK FT
You can access the [facility], offered by [facility], by registering with the following website: [email or web address]

## 2018-02-14 NOTE — DISCHARGE NOTE ADULT - PLAN OF CARE
Reduce PVC burden You had a radiofrequency ablation of your ventricular ectopy substrate. To do this, we went in through the veins in both of the legs. Please monitor the groin incision sites for bleeding or swelling. Please avoid tub baths (you may shower), swimming, heavy lifting and strenuous exercise for one week while the area heals. If you have any concerns, please call the office. Leave the area uncovered and avoid creams.     We have started you on a new medicine called amiodarone. This is to help suppress the PVCs while the heart is healing after the ablation. The healing period after an ablation can take up to three months. Please take two pills, three times daily for one week. After one week, take one pill once per day. We will reevaluate this at your appointment on March 5. This prescription was called in to your pharmacy at [facility]. Improve heart function We will repeat your echocardiogram in the future to see if your heart function (EF) is improving after the ablation. Continue taking Lasix, Entresto and Carvedilol. Normal BP Continue taking Lasix, Entresto and Carvedilol. Normal cholesterol Continue taking atorvastatin and aspirin. Wear CPAP Please increase compliance with CPAP machine. It will help reduce risk for developing other arrhythmias, such as atrial fibrillation.

## 2018-02-14 NOTE — DISCHARGE NOTE ADULT - CARE PLAN
Principal Discharge DX:	Ventricular ectopy  Goal:	Reduce PVC burden  Assessment and plan of treatment:	You had a radiofrequency ablation of your ventricular ectopy substrate. To do this, we went in through the veins in both of the legs. Please monitor the groin incision sites for bleeding or swelling. Please avoid tub baths (you may shower), swimming, heavy lifting and strenuous exercise for one week while the area heals. If you have any concerns, please call the office. Leave the area uncovered and avoid creams.     We have started you on a new medicine called amiodarone. This is to help suppress the PVCs while the heart is healing after the ablation. The healing period after an ablation can take up to three months. Please take two pills, three times daily for one week. After one week, take one pill once per day. We will reevaluate this at your appointment on March 5. This prescription was called in to your pharmacy at [facility].  Secondary Diagnosis:	Cardiomyopathy  Goal:	Improve heart function  Assessment and plan of treatment:	We will repeat your echocardiogram in the future to see if your heart function (EF) is improving after the ablation. Continue taking Lasix, Entresto and Carvedilol.  Secondary Diagnosis:	Essential hypertension  Goal:	Normal BP  Assessment and plan of treatment:	Continue taking Lasix, Entresto and Carvedilol.  Secondary Diagnosis:	Pure hypercholesterolemia  Goal:	Normal cholesterol  Assessment and plan of treatment:	Continue taking atorvastatin and aspirin.  Secondary Diagnosis:	KESHIA (obstructive sleep apnea)  Goal:	Wear CPAP  Assessment and plan of treatment:	Please increase compliance with CPAP machine. It will help reduce risk for developing other arrhythmias, such as atrial fibrillation.

## 2018-02-15 DIAGNOSIS — I50.22 CHRONIC SYSTOLIC (CONGESTIVE) HEART FAILURE: ICD-10-CM

## 2018-02-15 DIAGNOSIS — E78.5 HYPERLIPIDEMIA, UNSPECIFIED: ICD-10-CM

## 2018-02-15 DIAGNOSIS — N52.9 MALE ERECTILE DYSFUNCTION, UNSPECIFIED: ICD-10-CM

## 2018-02-15 DIAGNOSIS — K21.9 GASTRO-ESOPHAGEAL REFLUX DISEASE WITHOUT ESOPHAGITIS: ICD-10-CM

## 2018-02-15 DIAGNOSIS — I25.10 ATHEROSCLEROTIC HEART DISEASE OF NATIVE CORONARY ARTERY WITHOUT ANGINA PECTORIS: ICD-10-CM

## 2018-02-15 DIAGNOSIS — I42.9 CARDIOMYOPATHY, UNSPECIFIED: ICD-10-CM

## 2018-02-15 DIAGNOSIS — E11.9 TYPE 2 DIABETES MELLITUS WITHOUT COMPLICATIONS: ICD-10-CM

## 2018-02-15 DIAGNOSIS — I11.0 HYPERTENSIVE HEART DISEASE WITH HEART FAILURE: ICD-10-CM

## 2018-02-15 DIAGNOSIS — Z87.891 PERSONAL HISTORY OF NICOTINE DEPENDENCE: ICD-10-CM

## 2018-02-15 DIAGNOSIS — G47.33 OBSTRUCTIVE SLEEP APNEA (ADULT) (PEDIATRIC): ICD-10-CM

## 2018-02-20 DIAGNOSIS — I50.22 CHRONIC SYSTOLIC (CONGESTIVE) HEART FAILURE: ICD-10-CM

## 2018-02-20 DIAGNOSIS — I25.10 ATHEROSCLEROTIC HEART DISEASE OF NATIVE CORONARY ARTERY WITHOUT ANGINA PECTORIS: ICD-10-CM

## 2018-02-20 DIAGNOSIS — G47.33 OBSTRUCTIVE SLEEP APNEA (ADULT) (PEDIATRIC): ICD-10-CM

## 2018-02-20 DIAGNOSIS — I49.3 VENTRICULAR PREMATURE DEPOLARIZATION: ICD-10-CM

## 2018-02-20 DIAGNOSIS — N52.9 MALE ERECTILE DYSFUNCTION, UNSPECIFIED: ICD-10-CM

## 2018-02-20 DIAGNOSIS — E78.5 HYPERLIPIDEMIA, UNSPECIFIED: ICD-10-CM

## 2018-02-20 DIAGNOSIS — Z87.891 PERSONAL HISTORY OF NICOTINE DEPENDENCE: ICD-10-CM

## 2018-02-20 DIAGNOSIS — I11.0 HYPERTENSIVE HEART DISEASE WITH HEART FAILURE: ICD-10-CM

## 2018-02-20 DIAGNOSIS — E11.9 TYPE 2 DIABETES MELLITUS WITHOUT COMPLICATIONS: ICD-10-CM

## 2018-02-20 DIAGNOSIS — I42.9 CARDIOMYOPATHY, UNSPECIFIED: ICD-10-CM

## 2018-02-20 DIAGNOSIS — K21.9 GASTRO-ESOPHAGEAL REFLUX DISEASE WITHOUT ESOPHAGITIS: ICD-10-CM

## 2018-02-21 RX ORDER — AMIODARONE HYDROCHLORIDE 400 MG/1
1 TABLET ORAL
Qty: 30 | Refills: 0 | OUTPATIENT
Start: 2018-02-21 | End: 2018-03-22

## 2018-02-22 PROCEDURE — 93613 INTRACARDIAC EPHYS 3D MAPG: CPT

## 2018-02-22 PROCEDURE — 74150 CT ABDOMEN W/O CONTRAST: CPT

## 2018-02-22 PROCEDURE — 82962 GLUCOSE BLOOD TEST: CPT

## 2018-02-22 PROCEDURE — 80048 BASIC METABOLIC PNL TOTAL CA: CPT

## 2018-02-22 PROCEDURE — 93662 INTRACARDIAC ECG (ICE): CPT

## 2018-02-22 PROCEDURE — C1766: CPT

## 2018-02-22 PROCEDURE — 93623 PRGRMD STIMJ&PACG IV RX NFS: CPT

## 2018-02-22 PROCEDURE — C1894: CPT

## 2018-02-22 PROCEDURE — C1730: CPT

## 2018-02-22 PROCEDURE — 93656 COMPRE EP EVAL ABLTJ ATR FIB: CPT

## 2018-02-22 PROCEDURE — 83735 ASSAY OF MAGNESIUM: CPT

## 2018-02-22 PROCEDURE — 71250 CT THORAX DX C-: CPT

## 2018-02-22 PROCEDURE — 85610 PROTHROMBIN TIME: CPT

## 2018-02-22 PROCEDURE — 36415 COLL VENOUS BLD VENIPUNCTURE: CPT

## 2018-02-22 PROCEDURE — C1759: CPT

## 2018-02-22 PROCEDURE — 85730 THROMBOPLASTIN TIME PARTIAL: CPT

## 2018-02-22 PROCEDURE — C1732: CPT

## 2018-03-05 ENCOUNTER — APPOINTMENT (OUTPATIENT)
Dept: HEART AND VASCULAR | Facility: CLINIC | Age: 68
End: 2018-03-05
Payer: MEDICARE

## 2018-03-05 DIAGNOSIS — Z82.49 FAMILY HISTORY OF ISCHEMIC HEART DISEASE AND OTHER DISEASES OF THE CIRCULATORY SYSTEM: ICD-10-CM

## 2018-03-05 DIAGNOSIS — K21.9 GASTRO-ESOPHAGEAL REFLUX DISEASE W/OUT ESOPHAGITIS: ICD-10-CM

## 2018-03-05 DIAGNOSIS — Z86.69 PERSONAL HISTORY OF OTHER DISEASES OF THE NERVOUS SYSTEM AND SENSE ORGANS: ICD-10-CM

## 2018-03-05 DIAGNOSIS — Z86.39 PERSONAL HISTORY OF OTHER ENDOCRINE, NUTRITIONAL AND METABOLIC DISEASE: ICD-10-CM

## 2018-03-05 DIAGNOSIS — Z86.79 PERSONAL HISTORY OF OTHER DISEASES OF THE CIRCULATORY SYSTEM: ICD-10-CM

## 2018-03-05 DIAGNOSIS — Z87.438 PERSONAL HISTORY OF OTHER DISEASES OF MALE GENITAL ORGANS: ICD-10-CM

## 2018-03-05 PROCEDURE — 99214 OFFICE O/P EST MOD 30 MIN: CPT

## 2018-06-04 ENCOUNTER — APPOINTMENT (OUTPATIENT)
Dept: CARDIOLOGY | Facility: CLINIC | Age: 68
End: 2018-06-04

## 2018-06-04 VITALS
SYSTOLIC BLOOD PRESSURE: 145 MMHG | BODY MASS INDEX: 30.94 KG/M2 | HEART RATE: 55 BPM | HEIGHT: 71 IN | WEIGHT: 221 LBS | OXYGEN SATURATION: 97 % | DIASTOLIC BLOOD PRESSURE: 77 MMHG | TEMPERATURE: 98 F

## 2018-06-04 DIAGNOSIS — Z86.39 PERSONAL HISTORY OF OTHER ENDOCRINE, NUTRITIONAL AND METABOLIC DISEASE: ICD-10-CM

## 2018-06-04 DIAGNOSIS — Z82.49 FAMILY HISTORY OF ISCHEMIC HEART DISEASE AND OTHER DISEASES OF THE CIRCULATORY SYSTEM: ICD-10-CM

## 2018-06-04 DIAGNOSIS — I25.10 ATHEROSCLEROTIC HEART DISEASE OF NATIVE CORONARY ARTERY W/OUT ANGINA PECTORIS: ICD-10-CM

## 2018-06-04 DIAGNOSIS — Z87.891 PERSONAL HISTORY OF NICOTINE DEPENDENCE: ICD-10-CM

## 2018-06-04 DIAGNOSIS — Z87.438 PERSONAL HISTORY OF OTHER DISEASES OF MALE GENITAL ORGANS: ICD-10-CM

## 2018-06-04 DIAGNOSIS — Z87.09 PERSONAL HISTORY OF OTHER DISEASES OF THE RESPIRATORY SYSTEM: ICD-10-CM

## 2018-06-05 ENCOUNTER — NON-APPOINTMENT (OUTPATIENT)
Age: 68
End: 2018-06-05

## 2018-06-05 PROBLEM — Z87.891 FORMER SMOKER: Status: ACTIVE | Noted: 2018-03-05

## 2018-06-05 PROBLEM — Z82.49 FAMILY HISTORY OF CONGESTIVE HEART FAILURE: Status: ACTIVE | Noted: 2018-06-05

## 2018-06-05 PROBLEM — I25.10 ATHEROSCLEROSIS OF CORONARY ARTERY OF NATIVE HEART, ANGINA PRESENCE UNSPECIFIED, UNSPECIFIED VESSEL OR LESION TYPE: Status: RESOLVED | Noted: 2018-06-05 | Resolved: 2018-06-05

## 2018-06-05 PROBLEM — Z86.39 HISTORY OF VITAMIN D DEFICIENCY: Status: RESOLVED | Noted: 2018-06-05 | Resolved: 2018-06-05

## 2018-06-05 PROBLEM — Z87.438 HISTORY OF ERECTILE DYSFUNCTION: Status: RESOLVED | Noted: 2018-06-05 | Resolved: 2018-06-05

## 2018-06-05 PROBLEM — Z82.49 FAMILY HISTORY OF CARDIOMYOPATHY: Status: ACTIVE | Noted: 2018-06-05

## 2018-06-05 PROBLEM — Z87.09 HISTORY OF ASTHMA: Status: RESOLVED | Noted: 2018-06-05 | Resolved: 2018-06-05

## 2018-06-05 RX ORDER — ASPIRIN 81 MG
81 TABLET, DELAYED RELEASE (ENTERIC COATED) ORAL
Refills: 0 | Status: ACTIVE | COMMUNITY

## 2018-06-20 ENCOUNTER — RX RENEWAL (OUTPATIENT)
Age: 68
End: 2018-06-20

## 2018-11-05 ENCOUNTER — APPOINTMENT (OUTPATIENT)
Dept: CARDIOLOGY | Facility: CLINIC | Age: 68
End: 2018-11-05

## 2018-11-26 ENCOUNTER — RX RENEWAL (OUTPATIENT)
Age: 68
End: 2018-11-26

## 2019-02-08 ENCOUNTER — RX RENEWAL (OUTPATIENT)
Age: 69
End: 2019-02-08

## 2019-03-06 ENCOUNTER — APPOINTMENT (OUTPATIENT)
Dept: CARDIOLOGY | Facility: CLINIC | Age: 69
End: 2019-03-06
Payer: MEDICARE

## 2019-03-06 VITALS
HEART RATE: 79 BPM | BODY MASS INDEX: 30.96 KG/M2 | DIASTOLIC BLOOD PRESSURE: 59 MMHG | OXYGEN SATURATION: 95 % | WEIGHT: 222 LBS | SYSTOLIC BLOOD PRESSURE: 116 MMHG

## 2019-03-06 DIAGNOSIS — I49.3 VENTRICULAR PREMATURE DEPOLARIZATION: ICD-10-CM

## 2019-03-06 PROCEDURE — 99215 OFFICE O/P EST HI 40 MIN: CPT

## 2019-03-06 PROCEDURE — 93000 ELECTROCARDIOGRAM COMPLETE: CPT

## 2019-03-10 ENCOUNTER — NON-APPOINTMENT (OUTPATIENT)
Age: 69
End: 2019-03-10

## 2019-03-10 PROBLEM — I49.3 VENTRICULAR ECTOPY: Status: RESOLVED | Noted: 2018-06-05 | Resolved: 2019-03-10

## 2019-03-10 NOTE — HISTORY OF PRESENT ILLNESS
[FreeTextEntry1] : 68-year-old man\par Routine followup\par "I feel okay." Kofi denies chest pain, shortness of breath, palpitations or syncope. He continues to express great concern about his daughter who carries a diagnosis of a severe nonischemic cardiomyopathy with an ejection fraction of 15%. She has required the insertion of an AICD. Mr. Brown himself states that he feels significantly better with increased exercise tolerance since taking the present medical regimen

## 2019-03-10 NOTE — DISCUSSION/SUMMARY
[FreeTextEntry1] : Cardiomyopathy\par The working diagnosis is a nonischemic cardiomyopathy. In 8/13 PET CT scan failed to demonstrate any evidence of myocardial ischemia. A 10/16 [place] can sestamibi study showed normal perfusion. The 2/18 coronary arteriogram demonstrated nonobstructive disease. Left ventricular systolic function is depressed as reflected by a left ventricular ejection fraction of 28-35% on the 10/16 gated sestamibi study and  30% on the 2/18 left ventriculogram with global hypokinesis. The 11/18 transthoracic echocardiogram demonstrated left ventricular dilatation with an end-diastolic dimension of 6.2 cm and a left ventricular ejection fraction of 35%  The possibility that ventricular ectopy contributes to the development of left ventricular systolic dysfunction cannot be ruled out. There is an increased risk of malignant ventricular arrhythmias if left ventricular systolic function does not improve with medical therapy. There is no clinical congestive heart failure. The present cardiac physical  examination is consistent with a euvolemic state New York Heart Association class I-II.\par \par I have recommended the following:\par 1. Continue the present medical regimen \par 2. Echocardiogram prior to next office evaluation\par 3. AICD implantation dependent on the above and the patient's clinical course\par \par \par Atherosclerotic heart disease\par Atherosclerotic heart disease is stable. In 8/13 PET CT scan showed no ischemia with an ejection fraction of 40% . A  2/18 coronary arteriogram demonstrated a 30% mid LAD stenosis. The left circumflex OM1 had a 30% proximal stenosis with an FFR of 0.98. The right coronary artery was large and dominant with 30% mid stenosis. In view of the lack of angina above coronary anatomy and clinical course continued medical management is indicated.\par \par I have recommended the following:\par 1 risk factor modification\par 2 no further cardiac testing for this problem at this time.\par \par \par Ventricular ectopy\par Eren has a long history of ventricular premature ventricular depolarizations.. There is no history of sustained ventricular tachycardia or syncope. The ventricular ectopy was thought to be a causative factor for depressed left ventricular systolic function. A 1/17 Holter monitor demonstrated sinus rhythm with frequent  and coupled premature ventricular depolarizations. Ventricular ectopy represented 19% of the ventricular beats. In 3/17 he underwent an electrophysiological  study. 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 No ablation was performed. Verapamil was administered at that time and subsequent discontinued. A second electrophysiological study was performed in 2/18.The dominant ventricular ectopy was mapped at the distal great cardiac vein. After ablation the ventricular ectopy became more frequent and mapped at the perhissian area. Due to the high risk for complete heart block further ablation was not performed. Amiodarone was recommended. However it was taken only for 30 days and then discontinued.\par \par I have recommended the following\par 1. Continue the present medical regimen \par 2. Anticipate Holter monitor later 2019\par 3. Followup with the arrhythmia service Dr. BOWEN Shore\par \par \par Hyperlipidemia\par Hyperlipidemia represents a risk factor for progressive atherosclerotic disease. Although nonobstructive, Mr. Brown does have known atherosclerotic heart disease as reflected by the 2/18 coronary arteriogram. As such, the target LDL level is about 70. The most recent lipid levels are not available for review. The dose of atorvastatin may be increased if required to obtain optimal levels. Nonpharmacological therapy, specifically diet and exercise are emphasized as major aspects of treatment.\par \par I have recommended the following:\par 1 low-salt low-fat low-cholesterol diet. Regular aerobic exercise. Weight loss.\par 2. Continue the present medical regimen\par 3 target LDL level to about 70 as discussed above\par 4 increase atorvastatin dose if required to obtain optimal levels as noted above\par 5 laboratory studies including lipid profile prior to next office evaluation.\par \par \par \par  .

## 2019-03-11 ENCOUNTER — RX RENEWAL (OUTPATIENT)
Age: 69
End: 2019-03-11

## 2019-03-12 NOTE — PATIENT PROFILE ADULT. - PMH
All other review of systems negative, except as noted in HPI
Cardiomyopathy    Erectile dysfunction    Hyperlipidemia    Hypertension    KESHIA (obstructive sleep apnea)    Ventricular ectopy

## 2019-05-08 ENCOUNTER — APPOINTMENT (OUTPATIENT)
Dept: CARDIOLOGY | Facility: CLINIC | Age: 69
End: 2019-05-08
Payer: MEDICARE

## 2019-05-08 PROCEDURE — 93225 XTRNL ECG REC<48 HRS REC: CPT

## 2019-07-14 PROCEDURE — 93227 XTRNL ECG REC<48 HR R&I: CPT

## 2019-09-26 ENCOUNTER — APPOINTMENT (OUTPATIENT)
Dept: CARDIOLOGY | Facility: CLINIC | Age: 69
End: 2019-09-26

## 2019-09-26 DIAGNOSIS — Z86.39 PERSONAL HISTORY OF OTHER ENDOCRINE, NUTRITIONAL AND METABOLIC DISEASE: ICD-10-CM

## 2019-10-16 ENCOUNTER — APPOINTMENT (OUTPATIENT)
Dept: CARDIOLOGY | Facility: CLINIC | Age: 69
End: 2019-10-16

## 2019-10-22 ENCOUNTER — APPOINTMENT (OUTPATIENT)
Dept: CARDIOLOGY | Facility: CLINIC | Age: 69
End: 2019-10-22
Payer: MEDICARE

## 2019-10-22 PROCEDURE — 93306 TTE W/DOPPLER COMPLETE: CPT

## 2019-10-23 LAB
ALBUMIN SERPL ELPH-MCNC: 4.8 G/DL
ALP BLD-CCNC: 79 U/L
ALT SERPL-CCNC: 19 U/L
AST SERPL-CCNC: 18 U/L
BASOPHILS # BLD AUTO: 0.03 K/UL
BASOPHILS NFR BLD AUTO: 0.6 %
BILIRUB DIRECT SERPL-MCNC: 0.1 MG/DL
BILIRUB INDIRECT SERPL-MCNC: 0.4 MG/DL
BILIRUB SERPL-MCNC: 0.5 MG/DL
CHOLEST SERPL-MCNC: 207 MG/DL
CHOLEST/HDLC SERPL: 4.6 RATIO
CK SERPL-CCNC: 99 U/L
EOSINOPHIL # BLD AUTO: 0.32 K/UL
EOSINOPHIL NFR BLD AUTO: 6.5 %
HCT VFR BLD CALC: 45 %
HDLC SERPL-MCNC: 45 MG/DL
HGB BLD-MCNC: 14.4 G/DL
IMM GRANULOCYTES NFR BLD AUTO: 0.4 %
LDLC SERPL CALC-MCNC: 118 MG/DL
LYMPHOCYTES # BLD AUTO: 1.14 K/UL
LYMPHOCYTES NFR BLD AUTO: 23.2 %
MAN DIFF?: NORMAL
MCHC RBC-ENTMCNC: 30.4 PG
MCHC RBC-ENTMCNC: 32 GM/DL
MCV RBC AUTO: 95.1 FL
MONOCYTES # BLD AUTO: 0.51 K/UL
MONOCYTES NFR BLD AUTO: 10.4 %
NEUTROPHILS # BLD AUTO: 2.9 K/UL
NEUTROPHILS NFR BLD AUTO: 58.9 %
PLATELET # BLD AUTO: 160 K/UL
PROT SERPL-MCNC: 7.1 G/DL
RBC # BLD: 4.73 M/UL
RBC # FLD: 12.7 %
TRIGL SERPL-MCNC: 222 MG/DL
WBC # FLD AUTO: 4.92 K/UL

## 2019-12-12 ENCOUNTER — RX RENEWAL (OUTPATIENT)
Age: 69
End: 2019-12-12

## 2019-12-19 ENCOUNTER — APPOINTMENT (OUTPATIENT)
Dept: CARDIOLOGY | Facility: CLINIC | Age: 69
End: 2019-12-19
Payer: MEDICARE

## 2019-12-19 VITALS
DIASTOLIC BLOOD PRESSURE: 64 MMHG | BODY MASS INDEX: 31.52 KG/M2 | HEART RATE: 66 BPM | WEIGHT: 226 LBS | SYSTOLIC BLOOD PRESSURE: 128 MMHG | OXYGEN SATURATION: 97 %

## 2019-12-19 DIAGNOSIS — Z78.9 OTHER SPECIFIED HEALTH STATUS: ICD-10-CM

## 2019-12-19 PROCEDURE — 93000 ELECTROCARDIOGRAM COMPLETE: CPT

## 2019-12-19 PROCEDURE — 99215 OFFICE O/P EST HI 40 MIN: CPT

## 2019-12-20 ENCOUNTER — NON-APPOINTMENT (OUTPATIENT)
Age: 69
End: 2019-12-20

## 2019-12-21 PROBLEM — Z78.9 SOCIAL ALCOHOL USE: Status: ACTIVE | Noted: 2019-12-21

## 2019-12-21 NOTE — DISCUSSION/SUMMARY
[FreeTextEntry1] : Cardiomyopathy\par The working diagnosis is a nonischemic cardiomyopathy. In 8/13 PET CT scan failed to demonstrate any evidence of myocardial ischemia. A 10/16 [place] can sestamibi study showed normal perfusion. The 2/18 coronary arteriogram demonstrated nonobstructive disease. Left ventricular systolic function is depressed as reflected by a left ventricular ejection fraction of 28-35% on the 10/16 gated sestamibi study and  30% on the 2/18 left ventriculogram with global hypokinesis. The 10/19  transthoracic echocardiogram demonstrated  an end-diastolic dimension of 5.6  cm and a left ventricular ejection fraction of 37%  The possibility that ventricular ectopy contributes to the development of left ventricular systolic dysfunction cannot be ruled out. . There is no clinical congestive heart failure. The present cardiac physical  examination is consistent with a euvolemic state New York Heart Association class I-II.\par \par I have recommended the following:\par 1. Continue the present medical regimen \par 2. AICD implantation dependent on the above and the patient's clinical course\par \par \par Atherosclerotic heart disease\kalia Jason has  known  atherosclerotic heart disease..  An  8/13 PET CT scan showed no ischemia with an ejection fraction of 40% . A  2/18 coronary arteriogram demonstrated a 30% mid LAD stenosis. The left circumflex OM1 had a 30% proximal stenosis with an FFR of 0.98. The right coronary artery was large and dominant with 30% mid stenosis. In view of the lack of angina above coronary anatomy and clinical course continued medical management is indicated.\par \par I have recommended the following:\par 1 risk factor modification\par 2 no further cardiac testing for this problem at this time.\par \par \par Ventricular ectopy\kalia Jason has a long history of ventricular premature  depolarizations.. There is no history of sustained ventricular tachycardia or syncope. The ventricular ectopy was thought to be a causative factor for depressed left ventricular systolic function. A 1/17 Holter monitor demonstrated sinus rhythm with frequent  and coupled premature ventricular depolarizations. Ventricular ectopy represented 19% of the ventricular beats. In 3/17 he underwent an electrophysiological  study. 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 No ablation was performed. Verapamil was administered at that time and subsequent discontinued. A second electrophysiological study was performed in 2/18.The dominant ventricular ectopy was mapped at the distal great cardiac vein. After ablation the ventricular ectopy became more frequent and mapped at the perhissian area. Due to the high risk for complete heart block further ablation was not performed. Amiodarone was recommended. However it was taken only for 30 days and then discontinued. A 5/19 Holter monitor demonstrated sinus rhythm with frequent premature ventricular depolarizations and periods of bigeminy. The frequency of the ventricular ectopy was 12%. No symptom correlation and no sustained high grade arrhythmia were  seen.\par \par I have recommended the following\par 1. Continue the present medical regimen \par 2. Followup with the arrhythmia service Dr. BOWEN Shore\par \par \par Hyperlipidemia\par Hyperlipidemia represents a risk factor for progressive atherosclerotic disease. Although nonobstructive, Mr. Brown does have known atherosclerotic heart disease as reflected by the 2/18 coronary arteriogram. As such, the target LDL level is about 70. In 10/19 the serum cholesterol level was 207 triglycerides 222 HDL 45 and . The dose of atorvastatin may be increased if required to obtain optimal levels. Nonpharmacological therapy, specifically diet and exercise are emphasized as major aspects of treatment.\par \par I have recommended the following:\par 1 low-salt low-fat low-cholesterol diet. Regular aerobic exercise. Weight loss.\par 2. Continue the present medical regimen\par 3 target LDL level to about 70 as discussed above\par 4 increase atorvastatin dose if required to obtain optimal levels as noted above\par 5 laboratory studies including lipid profile prior to next office evaluation.\par \par \par Obesity\par Obesity exacerbates Mr. Brown's cardiovascular issues. Today Eren is 6 feet tall and weighs 226 pounds. Diet exercise and weight loss are advised.\par  .

## 2019-12-21 NOTE — HISTORY OF PRESENT ILLNESS
[FreeTextEntry1] : 69-year-old man\par Routine followup\par "I feel okay." Eren denies chest pain, shortness of breath at rest, palpitations, ankle edema or syncope. No orthopnea.

## 2019-12-21 NOTE — PHYSICAL EXAM
[Normal Conjunctiva] : the conjunctiva exhibited no abnormalities [Auscultation Breath Sounds / Voice Sounds] : lungs were clear to auscultation bilaterally [Heart Rate And Rhythm] : heart rate and rhythm were normal [Heart Sounds] : normal S1 and S2 [Murmurs] : no murmurs present [Arterial Pulses Normal] : the arterial pulses were normal [Bowel Sounds] : normal bowel sounds [Abdomen Soft] : soft [Abdomen Tenderness] : non-tender [Abnormal Walk] : normal gait [Cyanosis, Localized] : no localized cyanosis [] : no rash [Affect] : the affect was normal [FreeTextEntry1] : No edema. Dorsalis pedis pulses +1-2 bilaterally. No ulcerations

## 2020-01-08 ENCOUNTER — RX RENEWAL (OUTPATIENT)
Age: 70
End: 2020-01-08

## 2020-07-08 ENCOUNTER — APPOINTMENT (OUTPATIENT)
Dept: CARDIOLOGY | Facility: CLINIC | Age: 70
End: 2020-07-08
Payer: MEDICARE

## 2020-07-08 VITALS
OXYGEN SATURATION: 96 % | SYSTOLIC BLOOD PRESSURE: 120 MMHG | DIASTOLIC BLOOD PRESSURE: 60 MMHG | BODY MASS INDEX: 30.54 KG/M2 | HEART RATE: 60 BPM | WEIGHT: 219 LBS | TEMPERATURE: 98.5 F

## 2020-07-08 DIAGNOSIS — Z86.39 PERSONAL HISTORY OF OTHER ENDOCRINE, NUTRITIONAL AND METABOLIC DISEASE: ICD-10-CM

## 2020-07-08 PROCEDURE — 99214 OFFICE O/P EST MOD 30 MIN: CPT

## 2020-07-08 PROCEDURE — 93000 ELECTROCARDIOGRAM COMPLETE: CPT

## 2020-07-10 ENCOUNTER — NON-APPOINTMENT (OUTPATIENT)
Age: 70
End: 2020-07-10

## 2020-07-10 PROBLEM — Z86.39 HISTORY OF HYPERGLYCEMIA: Status: RESOLVED | Noted: 2018-06-05 | Resolved: 2020-07-10

## 2020-07-10 NOTE — HISTORY OF PRESENT ILLNESS
[FreeTextEntry1] : 70 year old man\par Routine follow up.\par \par "I feel good."  Eren denies  chest pain,shortness of breath at  rest, orthopnea palpitations or syncope.Occasional dyspnea on exertion  is unchanged from in the past and not progressive or severe. No orthopnea

## 2020-07-10 NOTE — DISCUSSION/SUMMARY
[FreeTextEntry1] : Cardiomyopathy\par The working diagnosis is a nonischemic cardiomyopathy. In 8/13 PET CT scan failed to demonstrate any evidence of myocardial ischemia. A 10/16 [place] can sestamibi study showed normal perfusion. The 2/18 coronary arteriogram demonstrated nonobstructive disease. Left ventricular systolic function is depressed as reflected by a left ventricular ejection fraction of 28-35% on the 10/16 gated sestamibi study and  30% on the 2/18 left ventriculogram with global hypokinesis. The 10/19  transthoracic echocardiogram demonstrated  an end-diastolic dimension of 5.6  cm and a left ventricular ejection fraction of 37%  The possibility that ventricular ectopy contributes to the development of left ventricular systolic dysfunction cannot be ruled out. . There is no clinical congestive heart failure. The present cardiac physical  examination is consistent with a euvolemic state New York Heart Association class I-II.\par \par I have recommended the following:\par 1. Continue the present medical regimen \par 2. AICD implantation dependent on the above and the patient's clinical course\par 3. Echocardiogram with next office evaluation in 6 months\par \par \par Atherosclerotic heart disease\par Eren has  known  atherosclerotic heart disease..  An  8/13 PET CT scan showed no ischemia with an ejection fraction of 40% . A  2/18 coronary arteriogram demonstrated a 30% mid LAD stenosis. The left circumflex OM1 had a 30% proximal stenosis with an FFR of 0.98. The right coronary artery was large and dominant with 30% mid stenosis. In view of the lack of angina above coronary anatomy and clinical course continued medical management is indicated.\par \par I have recommended the following:\par 1 risk factor modification\par 2 no further cardiac testing for this problem at this time.\par \par \par Ventricular ectopy\par Eren has a long history of ventricular premature  depolarizations.. There is no history of sustained ventricular tachycardia or syncope. The ventricular ectopy was thought to be a causative factor for depressed left ventricular systolic function. A 1/17 Holter monitor demonstrated sinus rhythm with frequent  and coupled premature ventricular depolarizations. Ventricular ectopy represented 19% of the ventricular beats. In 3/17 he underwent an electrophysiological  study. 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 No ablation was performed. Verapamil was administered at that time and subsequent discontinued. A second electrophysiological study was performed in 2/18.The dominant ventricular ectopy was mapped at the distal great cardiac vein. After ablation the ventricular ectopy became more frequent and mapped at the perhissian area. Due to the high risk for complete heart block further ablation was not performed. Amiodarone was recommended. However it was taken only for 30 days and then discontinued. A 5/19 Holter monitor demonstrated sinus rhythm with frequent premature ventricular depolarizations and periods of bigeminy. The frequency of the ventricular ectopy was 12%. No symptom correlation and no sustained high grade arrhythmia were  seen.\par \par I have recommended the following\par 1. Continue the present medical regimen \par 2. Followup with the arrhythmia service Dr. BOWEN Shore\par \par \par Hyperlipidemia\par Hyperlipidemia represents a risk factor for progressive atherosclerotic disease. Although nonobstructive, Mr. Brown does have known atherosclerotic heart disease as reflected by the 2/18 coronary arteriogram. As such, the target LDL level is about 70. In 5/20  the serum cholesterol level was 198 triglycerides 227  HDL 39  and . The dose of atorvastatin may be increased if required to obtain optimal levels. Nonpharmacological therapy, specifically diet and exercise are emphasized as major aspects of treatment.\par \par I have recommended the following:\par 1 low-salt low-fat low-cholesterol diabetic  diet. Regular aerobic exercise. Weight loss.\par 2. Continue the present medical regimen\par 3 target LDL level to about 70 as discussed above\par 4 increase atorvastatin dose to 20 mg/day\par 5 laboratory studies including lipid profile prior to next office evaluation.\par \par \par \par Diabetes\par The 5/20 laboratory studies demonstrate a fasting glucose level of 151 and hemoglobin A1c level of 7.6. The findings are consistent with a diagnosis of diabetes. Nonpharmacological therapy, specifically diet exercise and weight loss are emphasized as major aspects of treatment.\par \par I recommended the following:\par 1 low-salt low-fat low-cholesterol diabetic diet. Regular aerobic exercise and weight loss\par 2 diabetic management through primary care Dr. Bahena\par \par \par Obesity\par Obesity exacerbates Mr. Brown's cardiovascular issues. Today Eren is 6 feet tall and weighs 219  pounds. Diet exercise and weight loss are advised.\par \par \par The diagnosis, prognosis, risks, options and alternatives were explained at length to the patient. All questions were answered. Issues discussed included hyperlipidemia atherosclerotic heart disease left ventricular systolic dysfunction/cardiomyopathy diabetes obesity diet and exercise.\par \par Counseling and/or coordination of care\par Time was a significant factor for this patient encounter. Total time spent with the patient was 25 minutes. 50% of the time was devoted to counseling and/or coordination of care.\par  .

## 2020-07-10 NOTE — DISCHARGE NOTE ADULT - NS AS DC AMI YN
Problem: Knowledge Deficit  Goal: Patient/family/caregiver demonstrates understanding of disease process, treatment plan, medications, and discharge instructions  Description  Complete learning assessment and assess knowledge base  Interventions:  - Provide teaching at level of understanding  - Provide teaching via preferred learning methods  Outcome: Completed  Goal: Verbalizes understanding of labor plan  Description  Assess patient/family/caregiver's baseline knowledge level and ability to understand information  Provide education via patient/family/caregiver's preferred learning method at appropriate level of understanding  1  Provide teaching at level of understanding  2  Provide teaching via preferred learning method(s)  Outcome: Completed     Problem: Labor & Delivery  Goal: Manages discomfort  Description  Assess and monitor for signs and symptoms of discomfort  Assess patient's pain level regularly and per hospital policy  Administer medications as ordered  Support use of nonpharmacological methods to help control pain such as distraction, imagery, relaxation, and application of heat and cold  Collaborate with interdisciplinary team and patient to determine appropriate pain management plan  1  Include patient in decisions related to comfort  2  Offer non-pharmacological pain management interventions  3  Report ineffective pain management to physician  Outcome: Completed  Goal: Patient vital signs are stable  Description  1  Assess vital signs - vaginal delivery    Outcome: Completed     Problem: BIRTH - VAGINAL/ SECTION  Goal: Fetal and maternal status remain reassuring during the birth process  Description  INTERVENTIONS:  - Monitor vital signs  - Monitor fetal heart rate  - Monitor uterine activity  - Monitor labor progression (vaginal delivery)  - DVT prophylaxis  - Antibiotic prophylaxis  Outcome: Completed  Goal: Emotionally satisfying birthing experience for mother/fetus  Description  Interventions:  - Assess, plan, implement and evaluate the nursing care given to the patient in labor  - Advocate the philosophy that each childbirth experience is a unique experience and support the family's chosen level of involvement and control during the labor process   - Actively participate in both the patient's and family's teaching of the birth process  - Consider cultural, [employer] and age-specific factors and plan care for the patient in labor  Outcome: Completed no

## 2020-07-10 NOTE — PHYSICAL EXAM
[Normal Conjunctiva] : the conjunctiva exhibited no abnormalities [Auscultation Breath Sounds / Voice Sounds] : lungs were clear to auscultation bilaterally [Heart Rate And Rhythm] : heart rate and rhythm were normal [Heart Sounds] : normal S1 and S2 [Murmurs] : no murmurs present [Arterial Pulses Normal] : the arterial pulses were normal [Abdomen Soft] : soft [Bowel Sounds] : normal bowel sounds [Abdomen Tenderness] : non-tender [Abnormal Walk] : normal gait [Cyanosis, Localized] : no localized cyanosis [] : no rash [Affect] : the affect was normal [FreeTextEntry1] : No edema. Dorsalis pedis pulses +1-2 bilaterally. No ulcerations

## 2021-01-19 ENCOUNTER — APPOINTMENT (OUTPATIENT)
Dept: CARDIOLOGY | Facility: CLINIC | Age: 71
End: 2021-01-19

## 2021-02-13 ENCOUNTER — RX RENEWAL (OUTPATIENT)
Age: 71
End: 2021-02-13

## 2021-02-24 ENCOUNTER — APPOINTMENT (OUTPATIENT)
Dept: CARDIOLOGY | Facility: CLINIC | Age: 71
End: 2021-02-24

## 2021-03-22 ENCOUNTER — NON-APPOINTMENT (OUTPATIENT)
Age: 71
End: 2021-03-22

## 2021-03-23 ENCOUNTER — RESULT REVIEW (OUTPATIENT)
Age: 71
End: 2021-03-23

## 2021-03-28 DIAGNOSIS — Z86.79 PERSONAL HISTORY OF OTHER DISEASES OF THE CIRCULATORY SYSTEM: ICD-10-CM

## 2021-03-31 ENCOUNTER — APPOINTMENT (OUTPATIENT)
Dept: CARDIOLOGY | Facility: CLINIC | Age: 71
End: 2021-03-31
Payer: MEDICARE

## 2021-03-31 ENCOUNTER — NON-APPOINTMENT (OUTPATIENT)
Age: 71
End: 2021-03-31

## 2021-03-31 VITALS
SYSTOLIC BLOOD PRESSURE: 118 MMHG | DIASTOLIC BLOOD PRESSURE: 60 MMHG | TEMPERATURE: 97.3 F | BODY MASS INDEX: 31.1 KG/M2 | WEIGHT: 223 LBS | HEART RATE: 70 BPM | OXYGEN SATURATION: 98 %

## 2021-03-31 DIAGNOSIS — E11.9 TYPE 2 DIABETES MELLITUS W/OUT COMPLICATIONS: ICD-10-CM

## 2021-03-31 DIAGNOSIS — Z86.39 PERSONAL HISTORY OF OTHER ENDOCRINE, NUTRITIONAL AND METABOLIC DISEASE: ICD-10-CM

## 2021-03-31 PROCEDURE — 93000 ELECTROCARDIOGRAM COMPLETE: CPT

## 2021-03-31 PROCEDURE — 99213 OFFICE O/P EST LOW 20 MIN: CPT

## 2021-04-02 PROBLEM — E11.9 DIABETES: Status: ACTIVE | Noted: 2020-07-10

## 2021-04-02 PROBLEM — Z86.39 HISTORY OF DIABETES MELLITUS: Status: RESOLVED | Noted: 2021-04-02 | Resolved: 2021-04-02

## 2021-04-02 NOTE — DISCUSSION/SUMMARY
[FreeTextEntry1] : Cardiomyopathy\par The working diagnosis is a nonischemic cardiomyopathy. In 8/13 PET CT scan failed to demonstrate any evidence of myocardial ischemia. A 10/16 [place] can sestamibi study showed normal perfusion. The 2/18 coronary arteriogram demonstrated nonobstructive disease. Left ventricular systolic function is depressed as reflected by a left ventricular ejection fraction of 28-35% on the 10/16 gated sestamibi study and  30% on the 2/18 left ventriculogram with global hypokinesis. The  3/31   transthoracic echocardiogram demonstrated  an end-diastolic dimension of 6.5  cm and a left ventricular ejection fraction of 36%  The possibility that ventricular ectopy contributes to the development of left ventricular systolic dysfunction cannot be ruled out. . There is no clinical congestive heart failure. The present cardiac physical  examination is consistent with a euvolemic state New York Heart Association class I-II.\par \par I have recommended the following:\par 1. Continue the present medical regimen \par 2. AICD implantation dependent on the above and the patient's clinical course\par 3. No  further cardiac testing for this problem at this time\par \par \par Atherosclerotic heart disease\kalia Jason has  known  atherosclerotic heart disease..  An  8/13 PET CT scan showed no ischemia with an ejection fraction of 40% . A  2/18 coronary arteriogram demonstrated a 30% mid LAD stenosis. The left circumflex OM1 had a 30% proximal stenosis with an FFR of 0.98. The right coronary artery was large and dominant with 30% mid stenosis. In view of the lack of angina above coronary anatomy and clinical course continued medical management is indicated.\par \par I have recommended the following:\par 1 risk factor modification\par 2 no further cardiac testing for this problem at this time.\par \par \par Ventricular ectopy\par Eren has a long history of ventricular premature  depolarizations.. There is no history of sustained ventricular tachycardia or syncope. The ventricular ectopy was thought to be a causative factor for depressed left ventricular systolic function. A 1/17 Holter monitor demonstrated sinus rhythm with frequent  and coupled premature ventricular depolarizations. Ventricular ectopy represented 19% of the ventricular beats. In 3/17 he underwent an electrophysiological  study. 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 No ablation was performed. Verapamil was administered at that time and subsequent discontinued. A second electrophysiological study was performed in 2/18.The dominant ventricular ectopy was mapped at the distal great cardiac vein. After ablation the ventricular ectopy became more frequent and mapped at the perhissian area. Due to the high risk for complete heart block further ablation was not performed. Amiodarone was recommended. However it was taken only for 30 days and then discontinued. A 5/19 Holter monitor demonstrated sinus rhythm with frequent premature ventricular depolarizations and periods of bigeminy. The frequency of the ventricular ectopy was 12%. No symptom correlation and no sustained high grade arrhythmia were  seen.\par \par I have recommended the following\par 1. Continue the present medical regimen \par 2. Followup with the arrhythmia service Dr. BOWEN Shore\par \par \par \par \par Hyperlipidemia\par Hyperlipidemia represents a risk factor for progressive atherosclerotic disease. Although nonobstructive, Mr. Brown does have known atherosclerotic heart disease as reflected by the 2/18 coronary arteriogram. As such, the target LDL level is about 70. In 5/20  the serum cholesterol level was 198 triglycerides 227  HDL 39  and . The dose of atorvastatin may be increased if required to obtain optimal levels. Nonpharmacological therapy, specifically diet and exercise are emphasized as major aspects of treatment.\par \par I have recommended the following:\par 1 low-salt low-fat low-cholesterol diabetic  diet. Regular aerobic exercise. Weight loss.\par 2. Continue the present medical regimen\par 3 target LDL level to about 70 as discussed above\par 4  laboratory studies including lipid profile  through primary care Dr. Bahena.\par \par \par \par Diabetes\par The 5/20 laboratory studies demonstrate a fasting glucose level of 151 and hemoglobin A1c level of 7.6. The findings are consistent with a diagnosis of diabetes. Nonpharmacological therapy, specifically diet exercise and weight loss are emphasized as major aspects of treatment.\par \par I recommended the following:\par 1 low-salt low-fat low-cholesterol diabetic diet. Regular aerobic exercise and weight loss\par 2 diabetic management through primary care Dr. Bahena\par \par \par Obesity\par Obesity exacerbates Mr. Brown's cardiovascular issues. Today Eren is 6 feet tall and weighs 223 pounds. Eren has gained 4 pounds in the last 8 months  Diet exercise and weight loss are advised.\par \par \par The diagnosis, prognosis, risks, options and alternatives were explained at length to the patient. All questions were answered. Issues discussed included hyperlipidemia atherosclerotic heart disease left ventricular systolic dysfunction/cardiomyopathy diabetes obesity diet and exercise.\par \par Counseling and/or coordination of care\par Time was a significant factor for this patient encounter. Total time spent with the patient was 25 minutes. 50% of the time was devoted to counseling and/or coordination of care.\par  .

## 2021-04-02 NOTE — HISTORY OF PRESENT ILLNESS
[FreeTextEntry1] : 70 year old man\par routine follow up\par \par \par "I feel Ok." Eren denies chest pain,dyspnea palpitations or syncope. no ankle edema. no orthopnea

## 2021-04-06 ENCOUNTER — RX RENEWAL (OUTPATIENT)
Age: 71
End: 2021-04-06

## 2021-04-06 RX ORDER — FUROSEMIDE 20 MG/1
20 TABLET ORAL DAILY
Qty: 90 | Refills: 3 | Status: ACTIVE | COMMUNITY
Start: 2020-01-16 | End: 1900-01-01

## 2021-05-23 ENCOUNTER — RX RENEWAL (OUTPATIENT)
Age: 71
End: 2021-05-23

## 2021-08-18 ENCOUNTER — RX RENEWAL (OUTPATIENT)
Age: 71
End: 2021-08-18

## 2021-08-18 RX ORDER — ATORVASTATIN CALCIUM 40 MG/1
40 TABLET, FILM COATED ORAL
Qty: 90 | Refills: 3 | Status: ACTIVE | COMMUNITY
Start: 2017-09-15 | End: 1900-01-01

## 2021-10-18 ENCOUNTER — APPOINTMENT (OUTPATIENT)
Dept: CARDIOLOGY | Facility: CLINIC | Age: 71
End: 2021-10-18
Payer: MEDICARE

## 2021-10-18 ENCOUNTER — NON-APPOINTMENT (OUTPATIENT)
Age: 71
End: 2021-10-18

## 2021-10-18 VITALS
OXYGEN SATURATION: 100 % | HEART RATE: 62 BPM | BODY MASS INDEX: 30.82 KG/M2 | SYSTOLIC BLOOD PRESSURE: 130 MMHG | DIASTOLIC BLOOD PRESSURE: 68 MMHG | WEIGHT: 221 LBS

## 2021-10-18 DIAGNOSIS — E78.5 HYPERLIPIDEMIA, UNSPECIFIED: ICD-10-CM

## 2021-10-18 DIAGNOSIS — I49.3 VENTRICULAR PREMATURE DEPOLARIZATION: ICD-10-CM

## 2021-10-18 DIAGNOSIS — Z63.4 DISAPPEARANCE AND DEATH OF FAMILY MEMBER: ICD-10-CM

## 2021-10-18 DIAGNOSIS — I42.8 OTHER CARDIOMYOPATHIES: ICD-10-CM

## 2021-10-18 DIAGNOSIS — E66.9 OBESITY, UNSPECIFIED: ICD-10-CM

## 2021-10-18 DIAGNOSIS — I25.10 ATHEROSCLEROTIC HEART DISEASE OF NATIVE CORONARY ARTERY W/OUT ANGINA PECTORIS: ICD-10-CM

## 2021-10-18 PROCEDURE — 93000 ELECTROCARDIOGRAM COMPLETE: CPT

## 2021-10-18 PROCEDURE — 99213 OFFICE O/P EST LOW 20 MIN: CPT

## 2021-10-18 SDOH — SOCIAL STABILITY - SOCIAL INSECURITY: DISSAPEARANCE AND DEATH OF FAMILY MEMBER: Z63.4

## 2021-10-20 PROBLEM — Z63.4 WIDOWER: Status: ACTIVE | Noted: 2021-10-20

## 2021-10-20 NOTE — DISCUSSION/SUMMARY
[FreeTextEntry1] : Cardiomyopathy\par The working diagnosis is a nonischemic cardiomyopathy. In 8/13 PET CT scan failed to demonstrate any evidence of myocardial ischemia. A 10/16 [place] can sestamibi study showed normal perfusion. The 2/18 coronary arteriogram demonstrated nonobstructive disease. Left ventricular systolic function is depressed as reflected by a left ventricular ejection fraction of 28-35% on the 10/16 gated sestamibi study and  30% on the 2/18 left ventriculogram with global hypokinesis. The  3/31   transthoracic echocardiogram demonstrated  an end-diastolic dimension of 6.5  cm and a left ventricular ejection fraction of 36%  The possibility that ventricular ectopy contributes to the development of left ventricular systolic dysfunction cannot be ruled out. . There is no clinical congestive heart failure. The present cardiac physical  examination is consistent with a euvolemic state New York Heart Association class I-II.\par \par I have recommended the following:\par 1. Continue the present medical regimen \par 2. AICD implantation dependent on the above and the patient's clinical course\par 3. No  further cardiac testing for this problem at this time\par 4 Echocardiogram with  next office evaluation in 6 months\par \par \par Atherosclerotic heart disease\kalia Jason has  known  atherosclerotic heart disease..  An  8/13 PET CT scan showed no ischemia with an ejection fraction of 40% . A  2/18 coronary arteriogram demonstrated a 30% mid LAD stenosis. The left circumflex OM1 had a 30% proximal stenosis with an FFR of 0.98. The right coronary artery was large and dominant with 30% mid stenosis. In view of the lack of angina above coronary anatomy and clinical course continued medical management is indicated.\par \par I have recommended the following:\par 1 risk factor modification\par 2 no further cardiac testing for this problem at this time.\par 3. Echocardiogram with next office evaluation in  6  months\par \par \par \par \par Ventricular ectopy\kalia Jason has a long history of ventricular premature  depolarizations.. There is no history of sustained ventricular tachycardia or syncope. The ventricular ectopy was thought to be a causative factor for depressed left ventricular systolic function. A 1/17 Holter monitor demonstrated sinus rhythm with frequent  and coupled premature ventricular depolarizations. Ventricular ectopy represented 19% of the ventricular beats. In 3/17 he underwent an electrophysiological  study. 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 No ablation was performed. Verapamil was administered at that time and subsequent discontinued. A second electrophysiological study was performed in 2/18.The dominant ventricular ectopy was mapped at the distal great cardiac vein. After ablation the ventricular ectopy became more frequent and mapped at the perhissian area. Due to the high risk for complete heart block further ablation was not performed. Amiodarone was recommended. However it was taken only for 30 days and then discontinued. A 5/19 Holter monitor demonstrated sinus rhythm with frequent premature ventricular depolarizations and periods of bigeminy. The frequency of the ventricular ectopy was 12%. No symptom correlation and no sustained high grade arrhythmia were  seen.\par \par I have recommended the following\par 1. Continue the present medical regimen \par 2. Followup with the arrhythmia service Dr. BOWEN Shore\par 3. Consideration for  Zio patch/mobile telemetry study 2022\par \par \par \par \par Hyperlipidemia\par Hyperlipidemia represents a risk factor for progressive atherosclerotic disease. Although nonobstructive, Mr. Brown does have known atherosclerotic heart disease as reflected by the 2/18 coronary arteriogram. As such, the target LDL level is about 70. In 5/20  the serum cholesterol level was 198 triglycerides 227  HDL 39  and . The dose of atorvastatin may be increased if required to obtain optimal levels. Nonpharmacological therapy, specifically diet and exercise are emphasized as major aspects of treatment.\par \par I have recommended the following:\par 1 low-salt low-fat low-cholesterol diabetic  diet. Regular aerobic exercise. Weight loss.\par 2. Continue the present medical regimen\par 3 target LDL level to about 70 as discussed above\par 4  laboratory studies including lipid profile  through primary care Dr. Bahena.\par \par \par \par Diabetes\par The 5/20 laboratory studies demonstrate a fasting glucose level of 151 and hemoglobin A1c level of 7.6. The findings are consistent with a diagnosis of diabetes. Nonpharmacological therapy, specifically diet exercise and weight loss are emphasized as major aspects of treatment.\par \par I recommended the following:\par 1 low-salt low-fat low-cholesterol diabetic diet. Regular aerobic exercise and weight loss\par 2 diabetic management through primary care Dr. Bahena\par \par \par Obesity\par Obesity exacerbates Mr. Brown's cardiovascular issues. Today Eren is 6 feet tall and weighs 221 pounds.   Diet exercise and weight loss are advised.\par \par \par The diagnosis, prognosis, risks, options and alternatives were explained at length to the patient. All questions were answered. Issues discussed included hyperlipidemia atherosclerotic heart disease left ventricular systolic dysfunction/cardiomyopathy diabetes obesity non invasive cardiac testing  diet and exercise.\par \par Counseling and/or coordination of care\par Time was a significant factor for this patient encounter. Total time spent with the patient was 25 minutes. 50% of the time was devoted to counseling and/or coordination of care.\par  .

## 2021-10-20 NOTE — HISTORY OF PRESENT ILLNESS
[FreeTextEntry1] : 71-year-old man\par Routine followup\par "I feel OK"  Occasional dyspnea on exertion is not progressive, severe or changed from in the past.\par No chest pain/palpitations  or syncope. One episode of dizziness 3 weeks ago was attributed to "dehydration."  The symptom has not happened again since that time.

## 2021-12-20 ENCOUNTER — APPOINTMENT (OUTPATIENT)
Dept: CARDIOLOGY | Facility: CLINIC | Age: 71
End: 2021-12-20
Payer: MEDICARE

## 2021-12-20 PROCEDURE — 99441: CPT | Mod: 95

## 2022-04-20 RX ORDER — CARVEDILOL 12.5 MG/1
12.5 TABLET, FILM COATED ORAL
Qty: 180 | Refills: 3 | Status: ACTIVE | COMMUNITY
Start: 2020-01-08

## 2022-11-07 NOTE — PHYSICAL EXAM
Addendum  created 11/07/22 1136 by Luis M Kurtz MD    Clinical Note Signed, Review and Sign - Ready for Procedure, Review and Sign - Signed       [Normal Conjunctiva] : the conjunctiva exhibited no abnormalities [Auscultation Breath Sounds / Voice Sounds] : lungs were clear to auscultation bilaterally [Heart Rate And Rhythm] : heart rate and rhythm were normal [Heart Sounds] : normal S1 and S2 [Murmurs] : no murmurs present [Arterial Pulses Normal] : the arterial pulses were normal [Edema] : no peripheral edema present [Bowel Sounds] : normal bowel sounds [Abdomen Soft] : soft [Abdomen Tenderness] : non-tender [Abnormal Walk] : normal gait [Cyanosis, Localized] : no localized cyanosis [] : no rash [Affect] : the affect was normal [FreeTextEntry1] : no carotid bruit

## 2023-01-11 RX ORDER — SACUBITRIL AND VALSARTAN 97; 103 MG/1; MG/1
97-103 TABLET, FILM COATED ORAL
Qty: 180 | Refills: 3 | Status: ACTIVE | COMMUNITY
Start: 2023-01-11 | End: 1900-01-01

## 2023-04-27 RX ORDER — CARVEDILOL 12.5 MG/1
12.5 TABLET, FILM COATED ORAL
Qty: 180 | Refills: 3 | Status: ACTIVE | COMMUNITY
Start: 2023-04-27 | End: 1900-01-01

## 2024-06-12 RX ORDER — SACUBITRIL AND VALSARTAN 97; 103 MG/1; MG/1
97-103 TABLET, FILM COATED ORAL
Qty: 180 | Refills: 0 | Status: ACTIVE | COMMUNITY
Start: 2019-03-11

## 2024-08-06 ENCOUNTER — APPOINTMENT (OUTPATIENT)
Dept: CARDIOLOGY | Facility: CLINIC | Age: 74
End: 2024-08-06

## 2024-08-06 ENCOUNTER — NON-APPOINTMENT (OUTPATIENT)
Age: 74
End: 2024-08-06

## 2024-08-06 PROBLEM — U07.1 COVID-19 VIRUS INFECTION: Status: RESOLVED | Noted: 2024-08-06 | Resolved: 2024-08-06

## 2024-08-06 PROCEDURE — 93000 ELECTROCARDIOGRAM COMPLETE: CPT

## 2024-08-06 PROCEDURE — 99214 OFFICE O/P EST MOD 30 MIN: CPT

## 2024-08-08 NOTE — DISCUSSION/SUMMARY
[FreeTextEntry1] : Cardiomyopathy The working diagnosis is a nonischemic cardiomyopathy. In  PET CT scan failed to demonstrate any evidence of myocardial ischemia. A 10/16 [place] can sestamibi study showed normal perfusion. The  coronary arteriogram demonstrated nonobstructive disease. Left ventricular systolic function is depressed as reflected by a left ventricular ejection fraction of 28-35% on the 10/16 gated sestamibi study and  30% on the  left ventriculogram with global hypokinesis. The  3/21   transthoracic echocardiogram demonstrated  an end-diastolic dimension of 6.5  cm and a left ventricular ejection fraction of 36%  The possibility that ventricular ectopy contributes to the development of left ventricular systolic dysfunction cannot be ruled out. . There is no clinical congestive heart failure. The present cardiac physical  examination is consistent with a euvolemic state New York Heart Association class I-II.  I have recommended the followin. Continue the present medical regimen  2. AICD implantation dependent on the above and the patient's clinical course 3. No  further cardiac testing for this problem at this time 4 Echocardiogram with  next office evaluation    Atherosclerotic heart disease Eren has  known  atherosclerotic heart disease..  An   PET CT scan showed no ischemia with an ejection fraction of 40% . A   coronary arteriogram demonstrated a 30% mid LAD stenosis. The left circumflex OM1 had a 30% proximal stenosis with an FFR of 0.98. The right coronary artery was large and dominant with 30% mid stenosis. In view of the lack of angina above coronary anatomy and clinical course continued medical management is indicated.  I have recommended the followin risk factor modification 2 no further cardiac testing for this problem at this time. 3. 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 There is no history of sustained ventricular tachycardia or syncope. The ventricular ectopy was thought to be a causative factor for depressed left ventricular systolic function. A  Holter monitor demonstrated sinus rhythm with frequent  and coupled premature ventricular depolarizations. Ventricular ectopy represented 19% of the ventricular beats. In 3/17 he underwent an electrophysiological  study. 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 No ablation was performed. Verapamil was administered at that time and subsequent discontinued. A second electrophysiological study was performed in .The dominant ventricular ectopy was mapped at the distal great cardiac vein. After ablation the ventricular ectopy became more frequent and mapped at the perhissian area. Due to the high risk for complete heart block further ablation was not performed. Amiodarone was recommended. However it was taken only for 30 days and then discontinued. A  Holter monitor demonstrated sinus rhythm with frequent premature ventricular depolarizations and periods of bigeminy. The frequency of the ventricular ectopy was 12%. No symptom correlation and no sustained high grade arrhythmia were  seen.  I have recommended the following 1. Continue the present medical regimen  2.  Consideration for  Zio patch/mobile telemetry study      Hyperlipidemia Hyperlipidemia represents a risk factor for progressive atherosclerotic disease. Although nonobstructive, Mr. Brown does have known atherosclerotic heart disease as reflected by the  coronary arteriogram. As such, the target LDL level is about 70. In   the serum cholesterol level was 198 triglycerides 227  HDL 39  and .More recent lipid levels are not available for review The dose of atorvastatin may be increased if required to obtain optimal levels. Nonpharmacological therapy, specifically diet and exercise are emphasized as major aspects of treatment.  I have recommended the followin low-salt low-fat low-cholesterol diabetic  diet. Regular aerobic exercise. Weight loss. 2. Continue the present medical regimen 3 target LDL level to about 70 as discussed above 4  laboratory studies including lipid profile  through primary care.    Diabetes The  laboratory studies demonstrate a fasting glucose level of 151 and hemoglobin A1c level of 7.6. The findings are consistent with a diagnosis of diabetes. More recent laboratory studies are not available for review Nonpharmacological therapy, specifically diet exercise and weight loss are emphasized as major aspects of treatment.  I recommended the followin low-salt low-fat low-cholesterol diabetic diet. Regular aerobic exercise and weight loss 2 diabetic management through primary care    Obesity Obesity exacerbates Mr. Brown's cardiovascular issues. Today Eren is 5 feet   11 inches tall and weighs 208 pounds. He has lost 13 pounds in the last 3 years   Continued  diet exercise and weight loss are advised.   The diagnosis, prognosis, risks, options and alternatives were explained at length to the patient. All questions were answered. Issues discussed included hyperlipidemia atherosclerotic heart disease left ventricular systolic dysfunction/cardiomyopathy diabetes obesity non invasive cardiac testing  diet and exercise.  Counseling and/or coordination of care Time was a significant factor for this patient encounter. Total time spent with the patient was  30  minutes. 50% of the time was devoted to counseling and/or coordination of care.  .

## 2024-08-08 NOTE — HISTORY OF PRESENT ILLNESS
[FreeTextEntry1] : 74-year-old man Routine follow-up for nonischemic cardiomyopathy atherosclerotic heart disease hyperlipidemia ventricular ectopy and obesity.  "I feel good."  Eren denies symptoms of chest pain, shortness of breath at rest palpitations or syncope.  No ankle edema.  No orthopnea.  He is physically active without restriction or limitation.  On extreme exertion on occasion he will hear a "wheeze" with shortness of breath.  However the symptoms are not progressive or severe

## 2024-12-12 ENCOUNTER — RESULT REVIEW (OUTPATIENT)
Age: 74
End: 2024-12-12

## 2024-12-20 ENCOUNTER — NON-APPOINTMENT (OUTPATIENT)
Age: 74
End: 2024-12-20

## 2024-12-20 ENCOUNTER — APPOINTMENT (OUTPATIENT)
Dept: CARDIOLOGY | Facility: CLINIC | Age: 74
End: 2024-12-20
Payer: MEDICARE

## 2024-12-20 VITALS
OXYGEN SATURATION: 98 % | SYSTOLIC BLOOD PRESSURE: 121 MMHG | BODY MASS INDEX: 29.85 KG/M2 | HEART RATE: 71 BPM | WEIGHT: 214 LBS | DIASTOLIC BLOOD PRESSURE: 58 MMHG

## 2024-12-20 DIAGNOSIS — E78.5 HYPERLIPIDEMIA, UNSPECIFIED: ICD-10-CM

## 2024-12-20 DIAGNOSIS — I49.3 VENTRICULAR PREMATURE DEPOLARIZATION: ICD-10-CM

## 2024-12-20 DIAGNOSIS — Z87.19 PERSONAL HISTORY OF OTHER DISEASES OF THE DIGESTIVE SYSTEM: ICD-10-CM

## 2024-12-20 DIAGNOSIS — R00.1 BRADYCARDIA, UNSPECIFIED: ICD-10-CM

## 2024-12-20 DIAGNOSIS — Z86.79 PERSONAL HISTORY OF OTHER DISEASES OF THE CIRCULATORY SYSTEM: ICD-10-CM

## 2024-12-20 DIAGNOSIS — E66.9 OBESITY, UNSPECIFIED: ICD-10-CM

## 2024-12-20 DIAGNOSIS — I25.10 ATHEROSCLEROTIC HEART DISEASE OF NATIVE CORONARY ARTERY W/OUT ANGINA PECTORIS: ICD-10-CM

## 2024-12-20 DIAGNOSIS — E11.9 TYPE 2 DIABETES MELLITUS W/OUT COMPLICATIONS: ICD-10-CM

## 2024-12-20 DIAGNOSIS — I42.8 OTHER CARDIOMYOPATHIES: ICD-10-CM

## 2024-12-20 PROCEDURE — 93000 ELECTROCARDIOGRAM COMPLETE: CPT | Mod: 59

## 2024-12-20 PROCEDURE — 99214 OFFICE O/P EST MOD 30 MIN: CPT

## 2024-12-20 PROCEDURE — 93246 EXT ECG>7D<15D RECORDING: CPT

## 2024-12-21 PROBLEM — Z87.19 HISTORY OF GASTROESOPHAGEAL REFLUX (GERD): Status: RESOLVED | Noted: 2024-12-21 | Resolved: 2024-12-21

## 2024-12-21 RX ORDER — CARVEDILOL 3.12 MG/1
TABLET, FILM COATED ORAL
Refills: 0 | Status: ACTIVE | COMMUNITY

## 2025-01-20 DIAGNOSIS — I49.3 VENTRICULAR PREMATURE DEPOLARIZATION: ICD-10-CM

## 2025-01-20 PROCEDURE — 93248 EXT ECG>7D<15D REV&INTERPJ: CPT

## 2025-03-11 RX ORDER — SACUBITRIL AND VALSARTAN 97; 103 MG/1; MG/1
97-103 TABLET, FILM COATED ORAL TWICE DAILY
Qty: 180 | Refills: 3 | Status: ACTIVE | COMMUNITY
Start: 2025-03-11 | End: 1900-01-01